# Patient Record
Sex: FEMALE | Race: WHITE | Employment: FULL TIME | URBAN - METROPOLITAN AREA
[De-identification: names, ages, dates, MRNs, and addresses within clinical notes are randomized per-mention and may not be internally consistent; named-entity substitution may affect disease eponyms.]

---

## 2017-11-27 ENCOUNTER — TELEPHONE (OUTPATIENT)
Dept: ENDOCRINOLOGY | Age: 57
End: 2017-11-27

## 2018-04-20 ENCOUNTER — OFFICE VISIT (OUTPATIENT)
Dept: ENDOCRINOLOGY | Age: 58
End: 2018-04-20

## 2018-04-20 VITALS
HEIGHT: 68 IN | BODY MASS INDEX: 21.7 KG/M2 | WEIGHT: 143.2 LBS | SYSTOLIC BLOOD PRESSURE: 128 MMHG | HEART RATE: 67 BPM | OXYGEN SATURATION: 98 % | DIASTOLIC BLOOD PRESSURE: 84 MMHG

## 2018-04-20 DIAGNOSIS — C73 THYROID CANCER (HCC): Primary | ICD-10-CM

## 2018-04-20 DIAGNOSIS — M85.80 OSTEOPENIA, UNSPECIFIED LOCATION: ICD-10-CM

## 2018-04-20 DIAGNOSIS — Z78.0 MENOPAUSE: ICD-10-CM

## 2018-04-20 DIAGNOSIS — E89.0 POSTOPERATIVE HYPOTHYROIDISM: ICD-10-CM

## 2018-04-20 DIAGNOSIS — E55.9 VITAMIN D DEFICIENCY: ICD-10-CM

## 2018-04-20 DIAGNOSIS — C73 THYROID CANCER (HCC): ICD-10-CM

## 2018-04-20 PROCEDURE — 99214 OFFICE O/P EST MOD 30 MIN: CPT | Performed by: INTERNAL MEDICINE

## 2018-04-20 RX ORDER — CHOLECALCIFEROL (VITAMIN D3) 1250 MCG
50000 CAPSULE ORAL WEEKLY
COMMUNITY
End: 2018-05-30 | Stop reason: SDUPTHER

## 2018-04-20 RX ORDER — LEVOTHYROXINE SODIUM 150 MCG
150 TABLET ORAL DAILY
COMMUNITY
End: 2018-04-20 | Stop reason: SDUPTHER

## 2018-04-20 RX ORDER — LEVOTHYROXINE SODIUM 150 MCG
150 TABLET ORAL DAILY
Qty: 30 TABLET | Refills: 1 | Status: SHIPPED | OUTPATIENT
Start: 2018-04-20 | End: 2018-04-30 | Stop reason: SDUPTHER

## 2018-04-20 ASSESSMENT — PATIENT HEALTH QUESTIONNAIRE - PHQ9
1. LITTLE INTEREST OR PLEASURE IN DOING THINGS: 0
SUM OF ALL RESPONSES TO PHQ QUESTIONS 1-9: 0
SUM OF ALL RESPONSES TO PHQ9 QUESTIONS 1 & 2: 0
2. FEELING DOWN, DEPRESSED OR HOPELESS: 0

## 2018-04-21 LAB
A/G RATIO: 2 (ref 1.1–2.2)
ALBUMIN SERPL-MCNC: 4.8 G/DL (ref 3.4–5)
ALP BLD-CCNC: 108 U/L (ref 40–129)
ALT SERPL-CCNC: 16 U/L (ref 10–40)
ANION GAP SERPL CALCULATED.3IONS-SCNC: 13 MMOL/L (ref 3–16)
ANTI-THYROGLOB ABS: 11 IU/ML
AST SERPL-CCNC: 24 U/L (ref 15–37)
BILIRUB SERPL-MCNC: 0.5 MG/DL (ref 0–1)
BUN BLDV-MCNC: 10 MG/DL (ref 7–20)
CALCIUM SERPL-MCNC: 9.8 MG/DL (ref 8.3–10.6)
CHLORIDE BLD-SCNC: 102 MMOL/L (ref 99–110)
CO2: 29 MMOL/L (ref 21–32)
CREAT SERPL-MCNC: 0.6 MG/DL (ref 0.6–1.1)
GFR AFRICAN AMERICAN: >60
GFR NON-AFRICAN AMERICAN: >60
GLOBULIN: 2.4 G/DL
GLUCOSE BLD-MCNC: 83 MG/DL (ref 70–99)
POTASSIUM SERPL-SCNC: 4.5 MMOL/L (ref 3.5–5.1)
SODIUM BLD-SCNC: 144 MMOL/L (ref 136–145)
T4 FREE: 1.8 NG/DL (ref 0.9–1.8)
TOTAL PROTEIN: 7.2 G/DL (ref 6.4–8.2)
TSH SERPL DL<=0.05 MIU/L-ACNC: 2.72 UIU/ML (ref 0.27–4.2)
VITAMIN D 25-HYDROXY: 38.5 NG/ML

## 2018-04-28 LAB — THYROGLOBULIN BY LC-MS/MS, SERUM/PLASMA: <0.5 NG/ML (ref 1.3–31.8)

## 2018-04-30 ENCOUNTER — TELEPHONE (OUTPATIENT)
Dept: ENDOCRINOLOGY | Age: 58
End: 2018-04-30

## 2018-04-30 DIAGNOSIS — E89.0 POSTOPERATIVE HYPOTHYROIDISM: ICD-10-CM

## 2018-04-30 RX ORDER — LEVOTHYROXINE SODIUM 150 MCG
150 TABLET ORAL DAILY
Qty: 30 TABLET | Refills: 5 | Status: SHIPPED | OUTPATIENT
Start: 2018-04-30 | End: 2018-10-29 | Stop reason: SDUPTHER

## 2018-05-31 RX ORDER — CHOLECALCIFEROL (VITAMIN D3) 1250 MCG
50000 CAPSULE ORAL WEEKLY
Qty: 4 CAPSULE | Refills: 3 | Status: SHIPPED | OUTPATIENT
Start: 2018-05-31 | End: 2018-06-04 | Stop reason: SDUPTHER

## 2018-06-01 ENCOUNTER — TELEPHONE (OUTPATIENT)
Dept: ENDOCRINOLOGY | Age: 58
End: 2018-06-01

## 2018-06-04 RX ORDER — CHOLECALCIFEROL (VITAMIN D3) 1250 MCG
50000 CAPSULE ORAL WEEKLY
Qty: 4 CAPSULE | Refills: 3 | Status: SHIPPED | OUTPATIENT
Start: 2018-06-04 | End: 2018-09-27 | Stop reason: SDUPTHER

## 2018-06-05 ENCOUNTER — TELEPHONE (OUTPATIENT)
Dept: ENDOCRINOLOGY | Age: 58
End: 2018-06-05

## 2018-07-20 ENCOUNTER — OFFICE VISIT (OUTPATIENT)
Dept: ENDOCRINOLOGY | Age: 58
End: 2018-07-20

## 2018-07-20 VITALS
BODY MASS INDEX: 21.73 KG/M2 | HEART RATE: 59 BPM | WEIGHT: 143.4 LBS | DIASTOLIC BLOOD PRESSURE: 88 MMHG | SYSTOLIC BLOOD PRESSURE: 126 MMHG | HEIGHT: 68 IN | OXYGEN SATURATION: 98 %

## 2018-07-20 DIAGNOSIS — C73 THYROID CANCER (HCC): ICD-10-CM

## 2018-07-20 DIAGNOSIS — E55.9 VITAMIN D DEFICIENCY: ICD-10-CM

## 2018-07-20 DIAGNOSIS — Z78.0 MENOPAUSE: ICD-10-CM

## 2018-07-20 DIAGNOSIS — M85.80 OSTEOPENIA, UNSPECIFIED LOCATION: ICD-10-CM

## 2018-07-20 DIAGNOSIS — E89.0 POSTOPERATIVE HYPOTHYROIDISM: Primary | ICD-10-CM

## 2018-07-20 PROCEDURE — 99214 OFFICE O/P EST MOD 30 MIN: CPT | Performed by: INTERNAL MEDICINE

## 2018-07-20 NOTE — PROGRESS NOTES
SUBJECTIVE:  Ehsan Austin is a 65 y.o. female who is here for hypothyroidism. 1. Postoperative hypothyroidism    This started in 2005. Patient was diagnosed with thyroid cancer. The problem has been unchanged. Patient started medication in 2005. Currently patient is on: Synthroid. Misses  0 doses a month. 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  8/31/2005 I-131 Tx 97mCi. Current complaints: fatigue    2. Thyroid cancer ([address]    History of obstructive symptoms: difficulty swallowing No, changes in voice/hoarseness No.  History of radiation to patient's neck: No  Resent iodine exposure: No  Family history includes no thyroid abnormalities. Family history of thyroid cancer: No    3. Osteopenia, unspecified location  Had rib fractures. 4. Vitamin D deficiency  No bone pain. 5. Menopause  No hot flashes. Stable Thyrogen stimulated antithyroglobulin level when compared to the prior 1/24/2012 study    Electronically Signed by: Diamante Stout, 11/28/2016 11:19 AM   Result Narrative   NM-THYROGEN INJECTION 95952    CLINICAL INDICATION: The patient is a 52-year-old female with a history of total thyroidectomy 6/30/2005 demonstrating multifocal papillary carcinoma with metastatic disease to lymph nodes, who received radioiodine ablation of a thyroid remnant on 8/31/2005 using 97 mCi of iodine-131. COMPARISON: Thyrogen stimulated thyroglobulin level 1/24/2012    TECHNIQUE: The patient received Thyrogen injections IM on day  1 and 2 followed by measurement of thyroglobulin and antithyroglobulin antibody levels 3 days after. FINDINGS: A stimulated thyroglobulin level was measured at 0.4 (compared with 0.3 on the prior 1/23/2012 measurement) with no evidence of antithyroglobulin antibodies.        Small heterogeneous area of decreased echogenicity within the left thyroid lobe resection bed that is nonspecific and may reflect residual tissue or postsurgical changes, not substantially changed from 1/2016. Electronically Signed by: Soheila Bernal MD, 10/27/2017 5:17 PM   Result Narrative   EXAMINATION: US-THYROID / NECK     DATE OF EXAM:  10/27/2017 4:40 PM    DEMOGRAPHICS: 65years old Female     INDICATION:     C74: Malignant neoplasm of thyroid gland (HCC)     Reason for Exam:  Malignant neoplasm of thyroid gland ([facility] [address] COMPARISON: Thyroid ultrasound 1/4/2016    TECHNIQUE: Sonographic evaluation of the thyroid was performed. FINDINGS:         Right Lobe:  The right thyroid lobe is surgically absent. Surgical bed appears normal.           Left Lobe:    The left thyroid lobe is surgically absent with a small heterogeneous hypoechoic area in the surgical bed that measures roughly 7 x 3 x 4 mm, not substantially changed from 4 x 6 x 4 mm on the prior study from 1/4/2016.           Isthmus:     Not visualized            Additional findings:   2 normal-appearing cervical lymph nodes are demonstrated. Past Medical History:   Diagnosis Date    Cancer of thyroid [facility]) 2007    Fracture 04/03/2018     Patient Active Problem List    Diagnosis Date Noted    Thyroid cancer ([facility] [address] 04/20/2018    Postoperative hypothyroidism 04/20/2018    Osteopenia 04/20/2018    Vitamin D deficiency 04/20/2018    Menopause 04/20/2018     Past Surgical History:   Procedure Laterality Date    CERVICAL FUSION  2007    TOTAL THYROIDECTOMY  2017     History reviewed. No pertinent family history. Social History     Social History    Marital status:       Spouse name: N/A    Number of children: N/A    Years of education: N/A     Social History Main Topics    Smoking status: Never Smoker    Smokeless tobacco: Never Used    Alcohol use Yes      Comment: ocassionally     Drug use: No    Sexual activity: No     Other Topics Concern    None     Social History Narrative    None     Current Outpatient Prescriptions   Medication Sig Dispense Refill    Cholecalciferol (VITAMIN D3) 02750 units CAPS Take 50,000 Units by mouth once a week 4 capsule 3    SYNTHROID 150 MCG tablet Take 1 tablet by mouth daily 30 tablet 5     No current facility-administered medications for this visit.       No Known Allergies  Family Status   Relation Status    Mother Alive    Father Alive    Sister Alive    Brother Alive    Son Alive    Son Alive       Review of Systems:  Constitutional: has fatigue, no fever, no recent weight gain, no recent weight loss, no changes in appetite  Eyes: no eye pain, no change in vision, no eye redness, no eye irritation, no double vision  Ears, nose, throat: no nasal congestion, no sore throat, no earache, no decrease in hearing, no hoarseness, no dry mouth, no sinus problems, no difficulty swallowing, no neck lumps, no dental problems, no mouth sores, no ringing in ears  Pulmonary: no shortness of breath, no wheezing, no dyspnea on exertion, no cough  Cardiovascular: no chest pain, no lower extremity edema, no orthopnea, no intermittent leg claudication, no palpitations  Gastrointestinal: no abdominal pain, no nausea, no vomiting, no diarrhea, no constipation, no heartburn, no bloating  Genitourinary: no dysuria, no urinary incontinence, no urinary hesitancy, no change in urinary frequency, no feelings of urinary urgency, no nocturia  Musculoskeletal: no joint swelling, no joint stiffness, no joint pain, no muscle cramps, no muscle pain, no bone pain  Integument/Breast: no hair loss, no skin rashes, no skin lesions, no itching, no dry skin, no breast pain, no breast mass, no skin hives, no skin discoloration, no nipple discharge  Neurological: no numbness, no tingling, no weakness, no confusion, no headaches, no dizziness, no fainting, no tremors, no decrease in memory, no balance problems  Psychiatric: no anxiety, no depression, no insomnia  Hematologic/Lymphatic: no tendency for easy bleeding, no swollen lymph nodes, no tendency for easy Thyroglobulin; Future    2. Postoperative hypothyroidism    - SYNTHROID 150 MCG tablet; Take 1 tablet by mouth daily Takes 1 tablet daily  Dispense: 30 tablet; Refill: 1  - T4, Free; Future  - TSH without Reflex; Future  - Comprehensive Metabolic Panel; Future    3. Osteopenia, unspecified location  Recent rib fracture. Obtain DXA. 4. Vitamin D deficiency    - Vitamin D 25 Hydroxy; Future    5. Menopause  Follow. 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  8/31/2005 I-131 Tx 97mCi. Obtained history from other than patient No    Malinda Gutierrez was counseled regarding symptoms of thyroid diagnosis, course and complications of disease if inadequately treated, side effects of medications, diagnosis, treatment options, and prognosis, risks, benefits, complications, and alternatives of treatment, labs, imaging and other studies and treatment targets and goals. She understands instructions and counseling. Return in about 6 months (around 1/20/2019) for thyroid problems.

## 2018-09-27 RX ORDER — METHOCARBAMOL 750 MG/1
TABLET ORAL
Qty: 4 CAPSULE | Refills: 3 | Status: SHIPPED | OUTPATIENT
Start: 2018-09-27 | End: 2019-04-14 | Stop reason: SDUPTHER

## 2018-10-29 DIAGNOSIS — E89.0 POSTOPERATIVE HYPOTHYROIDISM: ICD-10-CM

## 2018-10-30 ENCOUNTER — TELEPHONE (OUTPATIENT)
Dept: ENDOCRINOLOGY | Age: 58
End: 2018-10-30

## 2018-10-30 RX ORDER — LEVOTHYROXINE SODIUM 150 MCG
TABLET ORAL
Qty: 30 TABLET | Refills: 5 | Status: SHIPPED | OUTPATIENT
Start: 2018-10-30 | End: 2018-12-21 | Stop reason: SDUPTHER

## 2018-12-21 ENCOUNTER — OFFICE VISIT (OUTPATIENT)
Dept: ENDOCRINOLOGY | Age: 58
End: 2018-12-21
Payer: COMMERCIAL

## 2018-12-21 VITALS
SYSTOLIC BLOOD PRESSURE: 131 MMHG | WEIGHT: 145.6 LBS | HEIGHT: 68 IN | BODY MASS INDEX: 22.07 KG/M2 | HEART RATE: 67 BPM | DIASTOLIC BLOOD PRESSURE: 81 MMHG

## 2018-12-21 DIAGNOSIS — E89.0 POSTOPERATIVE HYPOTHYROIDISM: ICD-10-CM

## 2018-12-21 DIAGNOSIS — Z78.0 MENOPAUSE: ICD-10-CM

## 2018-12-21 DIAGNOSIS — M85.80 OSTEOPENIA, UNSPECIFIED LOCATION: ICD-10-CM

## 2018-12-21 DIAGNOSIS — C73 THYROID CANCER (HCC): Primary | ICD-10-CM

## 2018-12-21 DIAGNOSIS — E55.9 VITAMIN D DEFICIENCY: ICD-10-CM

## 2018-12-21 PROCEDURE — 99214 OFFICE O/P EST MOD 30 MIN: CPT | Performed by: INTERNAL MEDICINE

## 2018-12-21 RX ORDER — LEVOTHYROXINE SODIUM 150 MCG
TABLET ORAL
Qty: 30 TABLET | Refills: 5 | Status: SHIPPED | OUTPATIENT
Start: 2018-12-21 | End: 2019-01-09 | Stop reason: SDUPTHER

## 2018-12-21 NOTE — PROGRESS NOTES
may reflect residual tissue or postsurgical changes, not substantially changed from 1/2016. Electronically Signed by: Hannah Rios MD, 10/27/2017 5:17 PM   Result Narrative   EXAMINATION: US-THYROID / NECK     DATE OF EXAM:  10/27/2017 4:40 PM    DEMOGRAPHICS: 65years old Female     INDICATION:     C74: Malignant neoplasm of thyroid gland (HCC)     Reason for Exam:  Malignant neoplasm of thyroid gland ([facility] [address] COMPARISON: Thyroid ultrasound 1/4/2016    TECHNIQUE: Sonographic evaluation of the thyroid was performed. FINDINGS:         Right Lobe:  The right thyroid lobe is surgically absent. Surgical bed appears normal.           Left Lobe:    The left thyroid lobe is surgically absent with a small heterogeneous hypoechoic area in the surgical bed that measures roughly 7 x 3 x 4 mm, not substantially changed from 4 x 6 x 4 mm on the prior study from 1/4/2016.           Isthmus:     Not visualized            Additional findings:   2 normal-appearing cervical lymph nodes are demonstrated. Past Medical History:   Diagnosis Date    Cancer of thyroid [facility]) 2007    Fracture 04/03/2018     Patient Active Problem List    Diagnosis Date Noted    Thyroid cancer ([address] 04/20/2018    Postoperative hypothyroidism 04/20/2018    Osteopenia 04/20/2018    Vitamin D deficiency 04/20/2018    Menopause 04/20/2018     Past Surgical History:   Procedure Laterality Date    CERVICAL FUSION  2007    TOTAL THYROIDECTOMY  2017     History reviewed. No pertinent family history. Social History     Social History    Marital status:       Spouse name: N/A    Number of children: N/A    Years of education: N/A     Social History Main Topics    Smoking status: Never Smoker    Smokeless tobacco: Never Used    Alcohol use Yes      Comment: ocassionally     Drug use: No    Sexual activity: No     Other Topics Concern    None     Social History Narrative    None     Current Outpatient Prescriptions

## 2019-01-09 ENCOUNTER — PATIENT MESSAGE (OUTPATIENT)
Dept: ENDOCRINOLOGY | Age: 59
End: 2019-01-09

## 2019-01-09 DIAGNOSIS — E89.0 POSTOPERATIVE HYPOTHYROIDISM: ICD-10-CM

## 2019-01-09 RX ORDER — LEVOTHYROXINE SODIUM 150 MCG
TABLET ORAL
Qty: 30 TABLET | Refills: 5 | Status: SHIPPED | OUTPATIENT
Start: 2019-01-09 | End: 2019-06-21

## 2019-01-11 ENCOUNTER — TELEPHONE (OUTPATIENT)
Dept: ENDOCRINOLOGY | Age: 59
End: 2019-01-11

## 2019-04-15 RX ORDER — METHOCARBAMOL 750 MG/1
TABLET ORAL
Qty: 4 CAPSULE | Refills: 0 | Status: SHIPPED | OUTPATIENT
Start: 2019-04-15 | End: 2019-06-21

## 2019-05-14 ENCOUNTER — TELEPHONE (OUTPATIENT)
Dept: ENDOCRINOLOGY | Age: 59
End: 2019-05-14

## 2019-06-21 ENCOUNTER — OFFICE VISIT (OUTPATIENT)
Dept: ENDOCRINOLOGY | Age: 59
End: 2019-06-21
Payer: COMMERCIAL

## 2019-06-21 VITALS
WEIGHT: 140.2 LBS | SYSTOLIC BLOOD PRESSURE: 169 MMHG | OXYGEN SATURATION: 99 % | BODY MASS INDEX: 21.25 KG/M2 | HEIGHT: 68 IN | HEART RATE: 83 BPM | DIASTOLIC BLOOD PRESSURE: 109 MMHG

## 2019-06-21 DIAGNOSIS — E89.0 POSTOPERATIVE HYPOTHYROIDISM: Primary | ICD-10-CM

## 2019-06-21 DIAGNOSIS — C73 THYROID CANCER (HCC): ICD-10-CM

## 2019-06-21 DIAGNOSIS — E55.9 VITAMIN D DEFICIENCY: ICD-10-CM

## 2019-06-21 DIAGNOSIS — M85.80 OSTEOPENIA, UNSPECIFIED LOCATION: ICD-10-CM

## 2019-06-21 DIAGNOSIS — Z78.0 MENOPAUSE: ICD-10-CM

## 2019-06-21 PROCEDURE — 99214 OFFICE O/P EST MOD 30 MIN: CPT | Performed by: INTERNAL MEDICINE

## 2019-06-21 RX ORDER — LEVOTHYROXINE SODIUM 150 MCG
TABLET ORAL
Qty: 30 TABLET | Refills: 5 | Status: SHIPPED | OUTPATIENT
Start: 2019-06-21 | End: 2019-08-16 | Stop reason: DRUGHIGH

## 2019-06-21 NOTE — PROGRESS NOTES
0.3 x 0.4 x 0.7 cm. Stable Thyrogen stimulated antithyroglobulin level when compared to the prior 1/24/2012 study    Electronically Signed by: Bethaniegabino Isabel, 11/28/2016 11:19 AM   Result Narrative   NM-THYROGEN INJECTION [id number]    CLINICAL INDICATION: The patient is a 81-year-old female with a history of total thyroidectomy 6/30/2005 demonstrating multifocal papillary carcinoma with metastatic disease to lymph nodes, who received radioiodine ablation of a thyroid remnant on 8/31/2005 using 97 mCi of iodine-131. COMPARISON: Thyrogen stimulated thyroglobulin level 1/24/2012    TECHNIQUE: The patient received Thyrogen injections IM on day  1 and 2 followed by measurement of thyroglobulin and antithyroglobulin antibody levels 3 days after. FINDINGS: A stimulated thyroglobulin level was measured at 0.4 (compared with 0.3 on the prior 1/23/2012 measurement) with no evidence of antithyroglobulin antibodies. Small heterogeneous area of decreased echogenicity within the left thyroid lobe resection bed that is nonspecific and may reflect residual tissue or postsurgical changes, not substantially changed from 1/2016. Electronically Signed by: Tana Cool MD, 10/27/2017 5:17 PM   Result Narrative   EXAMINATION: US-THYROID / NECK     DATE OF EXAM:  10/27/2017 4:40 PM    DEMOGRAPHICS: 65years old Female     INDICATION:     C74: Malignant neoplasm of thyroid gland (HCC)     Reason for Exam:  Malignant neoplasm of thyroid gland ([address] COMPARISON: Thyroid ultrasound 1/4/2016    TECHNIQUE: Sonographic evaluation of the thyroid was performed. FINDINGS:         Right Lobe:  The right thyroid lobe is surgically absent.  Surgical bed appears normal.           Left Lobe:    The left thyroid lobe is surgically absent with a small heterogeneous hypoechoic area in the surgical bed that measures roughly 7 x 3 x 4 mm, not substantially changed from 4 x 6 x 4 mm on the prior study from 1/4/2016.           Isthmus:     Not visualized            Additional findings:   2 normal-appearing cervical lymph nodes are demonstrated. Past Medical History:   Diagnosis Date    Cancer of thyroid [facility]) 2007    Fracture 04/03/2018     Patient Active Problem List    Diagnosis Date Noted    Thyroid cancer ([facility] [address] 04/20/2018    Postoperative hypothyroidism 04/20/2018    Osteopenia 04/20/2018    Vitamin D deficiency 04/20/2018    Menopause 04/20/2018     Past Surgical History:   Procedure Laterality Date    CERVICAL FUSION  2007    TOTAL THYROIDECTOMY  2017     History reviewed. No pertinent family history. Social History     Socioeconomic History    Marital status:       Spouse name: None    Number of children: None    Years of education: None    Highest education level: None   Occupational History    None   Social Needs    Financial resource strain: None    Food insecurity:     Worry: None     Inability: None    Transportation needs:     Medical: None     Non-medical: None   Tobacco Use    Smoking status: Never Smoker    Smokeless tobacco: Never Used   Substance and Sexual Activity    Alcohol use: Yes     Comment: ocassionally     Drug use: No    Sexual activity: Never   Lifestyle    Physical activity:     Days per week: None     Minutes per session: None    Stress: None   Relationships    Social connections:     Talks on phone: None     Gets together: None     Attends Zoroastrian service: None     Active member of club or organization: None     Attends meetings of clubs or organizations: None     Relationship status: None    Intimate partner violence:     Fear of current or ex partner: None     Emotionally abused: None     Physically abused: None     Forced sexual activity: None   Other Topics Concern    None   Social History Narrative    None     Current Outpatient Medications   Medication Sig Dispense Refill    SYNTHROID 150 MCG tablet TAKE 1 TABLET BY MOUTH EVERY DAY 30 tablet 5    vitamin D (D3-50) 19434 UNIT CAPS TAKE ONE CAPSULE BY MOUTH ONCE every 2 weeks 4 capsule 3     No current facility-administered medications for this visit.       No Known Allergies  Family Status   Relation Name Status    Mother  Alive    Father  Alive    Sister  Alive    Brother  Alive    Son  Alive    Son  Alive       Review of Systems:  Constitutional: has fatigue, no fever, no recent weight gain, no recent weight loss, no changes in appetite  Eyes: no eye pain, no change in vision, no eye redness, no eye irritation, no double vision  Ears, nose, throat: no nasal congestion, no sore throat, no earache, no decrease in hearing, no hoarseness, no dry mouth, no sinus problems, no difficulty swallowing, no neck lumps, no dental problems, no mouth sores, no ringing in ears  Pulmonary: no shortness of breath, no wheezing, no dyspnea on exertion, no cough  Cardiovascular: no chest pain, no lower extremity edema, no orthopnea, no intermittent leg claudication, no palpitations  Gastrointestinal: no abdominal pain, no nausea, no vomiting, no diarrhea, no constipation, no heartburn, no bloating  Genitourinary: no dysuria, no urinary incontinence, no urinary hesitancy, no change in urinary frequency, no feelings of urinary urgency, no nocturia  Musculoskeletal: no joint swelling, no joint stiffness, no joint pain, no muscle cramps, no muscle pain, no bone pain  Integument/Breast: no hair loss, no skin rashes, no skin lesions, no itching, no dry skin, no breast pain, no breast mass, no skin hives, no skin discoloration, no nipple discharge  Neurological: no numbness, no tingling, no weakness, no confusion, no headaches, no dizziness, no fainting, no tremors, no decrease in memory, no balance problems  Psychiatric: no anxiety, no depression, no insomnia  Hematologic/Lymphatic: no tendency for easy bleeding, no swollen lymph nodes, no tendency for easy bruising  Immunology: no seasonal allergies, no frequent infections, no frequent illnesses  Endocrine: no temperature intolerance, no hot flashes, no hand tremor    OBJECTIVE:   BP (!) 157/100 (Site: Left Upper Arm, Position: Sitting, Cuff Size: Small Adult)   Pulse 83   Ht 5' 8\" (1.727 m)   Wt 140 lb 3.2 oz (63.6 kg)   SpO2 99%   BMI 21.32 kg/m²   Wt Readings from Last 3 Encounters:   06/21/19 140 lb 3.2 oz (63.6 kg)   12/21/18 145 lb 9.6 oz (66 kg)   07/20/18 143 lb 6.4 oz (65 kg)       Physical Exam:  Constitutional: no acute distress, well appearing, well nourished  Psychiatric: oriented to person, place and time, judgement, insight and normal, recent and remote memory and intact and mood, affect are normal  Skin: skin and subcutaneous tissue is normal without mass, normal turgor  Head and Face: examination of head and face revealed no abnormalities  Eyes: no lid or conjunctival swelling, no erythema or discharge, pupils are normal, equal, round, and reactive to light  Ears/Nose: external inspection of ears and nose revealed no abnormalities, hearing is grossly normal  Oropharynx/Mouth/Face: lips, tongue and gums are normal with no lesions, the voice quality was normal  Neck: neck is supple and symmetric, with midline trachea and no masses, thyroid is absent  Lymphatics: normal cervical lymph nodes, normal supraclavicular nodes  Pulmonary: no increased work of breathing or signs of respiratory distress, lungs are clear to auscultation  Cardiovascular: normal heart rate and rhythm, normal S1 and S2, no murmurs and pedal pulses and 2+ bilaterally, No edema  Abdomen: abdomen is soft, non-tender with no masses  Musculoskeletal: normal gait and station, exam of the digits and nails are normal  Neurological: normal coordination, normal general cortical function    Lab Review:  Lab Results   Component Value Date    TSH 2.72 04/20/2018     No results found for: FREET4     ASSESSMENT/PLAN:    1.  Thyroid cancer ([facility] [address]  Hydrogen stimulated scans on 1/24/2012 and 11/20/2016   Thyroglobulin

## 2019-08-04 ENCOUNTER — TELEPHONE (OUTPATIENT)
Dept: ENDOCRINOLOGY | Age: 59
End: 2019-08-04

## 2019-08-04 NOTE — TELEPHONE ENCOUNTER
Bone density worse, highest T-score is -2.3.  -2.5 is osteoporosis. Significant worsening of bone density in spine by over 11 percent, in hip over 13%. I recommend an appointment to discuss treatment options.

## 2019-08-13 ENCOUNTER — TELEPHONE (OUTPATIENT)
Dept: ENDOCRINOLOGY | Age: 59
End: 2019-08-13

## 2019-08-16 ENCOUNTER — OFFICE VISIT (OUTPATIENT)
Dept: ENDOCRINOLOGY | Age: 59
End: 2019-08-16
Payer: COMMERCIAL

## 2019-08-16 VITALS
SYSTOLIC BLOOD PRESSURE: 139 MMHG | WEIGHT: 135.2 LBS | BODY MASS INDEX: 20.49 KG/M2 | HEIGHT: 68 IN | OXYGEN SATURATION: 98 % | DIASTOLIC BLOOD PRESSURE: 89 MMHG | HEART RATE: 88 BPM

## 2019-08-16 DIAGNOSIS — E55.9 VITAMIN D DEFICIENCY: ICD-10-CM

## 2019-08-16 DIAGNOSIS — C73 THYROID CANCER (HCC): Primary | ICD-10-CM

## 2019-08-16 DIAGNOSIS — M85.80 OSTEOPENIA, UNSPECIFIED LOCATION: ICD-10-CM

## 2019-08-16 DIAGNOSIS — Z78.0 MENOPAUSE: ICD-10-CM

## 2019-08-16 DIAGNOSIS — E89.0 POSTOPERATIVE HYPOTHYROIDISM: ICD-10-CM

## 2019-08-16 PROCEDURE — 99214 OFFICE O/P EST MOD 30 MIN: CPT | Performed by: INTERNAL MEDICINE

## 2019-08-16 RX ORDER — LEVOTHYROXINE SODIUM 150 MCG
TABLET ORAL
Qty: 30 TABLET | Refills: 3
Start: 2019-08-16 | End: 2019-11-19

## 2019-08-16 RX ORDER — NEBIVOLOL HYDROCHLORIDE 5 MG/1
TABLET ORAL
Refills: 1 | COMMUNITY
Start: 2019-07-22

## 2019-08-16 RX ORDER — LEVOTHYROXINE SODIUM 0.15 MG/1
150 TABLET ORAL DAILY
COMMUNITY
End: 2019-08-16 | Stop reason: ALTCHOICE

## 2019-08-16 NOTE — PROGRESS NOTES
Isthmus:     Not visualized            Additional findings:   2 normal-appearing cervical lymph nodes are demonstrated. Past Medical History:   Diagnosis Date    Cancer of thyroid [facility]) 2007    Fracture 04/03/2018     Patient Active Problem List    Diagnosis Date Noted    Thyroid cancer ([facility] [address] 04/20/2018    Postoperative hypothyroidism 04/20/2018    Osteopenia 04/20/2018    Vitamin D deficiency 04/20/2018    Menopause 04/20/2018     Past Surgical History:   Procedure Laterality Date    CERVICAL FUSION  2007    TOTAL THYROIDECTOMY  2017     History reviewed. No pertinent family history. Social History     Socioeconomic History    Marital status:       Spouse name: None    Number of children: None    Years of education: None    Highest education level: None   Occupational History    None   Social Needs    Financial resource strain: None    Food insecurity:     Worry: None     Inability: None    Transportation needs:     Medical: None     Non-medical: None   Tobacco Use    Smoking status: Never Smoker    Smokeless tobacco: Never Used   Substance and Sexual Activity    Alcohol use: Yes     Comment: ocassionally     Drug use: No    Sexual activity: Never   Lifestyle    Physical activity:     Days per week: None     Minutes per session: None    Stress: None   Relationships    Social connections:     Talks on phone: None     Gets together: None     Attends Scientology service: None     Active member of club or organization: None     Attends meetings of clubs or organizations: None     Relationship status: None    Intimate partner violence:     Fear of current or ex partner: None     Emotionally abused: None     Physically abused: None     Forced sexual activity: None   Other Topics Concern    None   Social History Narrative    None     Current Outpatient Medications   Medication Sig Dispense Refill    BYSTOLIC 5 MG tablet TAKE 1 TABLET BY MOUTH EVERY DAY AT NIGHT  1   

## 2019-09-16 ENCOUNTER — TELEPHONE (OUTPATIENT)
Dept: ENDOCRINOLOGY | Age: 59
End: 2019-09-16

## 2019-09-16 DIAGNOSIS — M85.80 OSTEOPENIA, UNSPECIFIED LOCATION: ICD-10-CM

## 2019-09-16 DIAGNOSIS — E89.0 POSTOPERATIVE HYPOTHYROIDISM: ICD-10-CM

## 2019-09-16 DIAGNOSIS — Z78.0 MENOPAUSE: ICD-10-CM

## 2019-09-16 DIAGNOSIS — C73 THYROID CANCER (HCC): Primary | ICD-10-CM

## 2019-09-16 DIAGNOSIS — E55.9 VITAMIN D DEFICIENCY: ICD-10-CM

## 2019-09-28 ENCOUNTER — TELEPHONE (OUTPATIENT)
Dept: ENDOCRINOLOGY | Age: 59
End: 2019-09-28

## 2019-11-19 ENCOUNTER — OFFICE VISIT (OUTPATIENT)
Dept: ENDOCRINOLOGY | Age: 59
End: 2019-11-19
Payer: COMMERCIAL

## 2019-11-19 VITALS
SYSTOLIC BLOOD PRESSURE: 118 MMHG | BODY MASS INDEX: 21.13 KG/M2 | RESPIRATION RATE: 16 BRPM | WEIGHT: 139 LBS | HEART RATE: 64 BPM | DIASTOLIC BLOOD PRESSURE: 84 MMHG | OXYGEN SATURATION: 98 % | TEMPERATURE: 98.4 F

## 2019-11-19 DIAGNOSIS — E55.9 VITAMIN D DEFICIENCY: ICD-10-CM

## 2019-11-19 DIAGNOSIS — E89.0 POSTOPERATIVE HYPOTHYROIDISM: ICD-10-CM

## 2019-11-19 DIAGNOSIS — C73 THYROID CANCER (HCC): Primary | ICD-10-CM

## 2019-11-19 DIAGNOSIS — M85.80 OSTEOPENIA, UNSPECIFIED LOCATION: ICD-10-CM

## 2019-11-19 DIAGNOSIS — Z78.0 MENOPAUSE: ICD-10-CM

## 2019-11-19 PROCEDURE — 99214 OFFICE O/P EST MOD 30 MIN: CPT | Performed by: INTERNAL MEDICINE

## 2019-11-19 RX ORDER — LEVOTHYROXINE SODIUM 150 MCG
TABLET ORAL
Qty: 90 TABLET | Refills: 1 | Status: SHIPPED | OUTPATIENT
Start: 2019-11-19 | End: 2020-07-20

## 2020-03-06 ENCOUNTER — TELEPHONE (OUTPATIENT)
Dept: ENDOCRINOLOGY | Age: 60
End: 2020-03-06

## 2020-03-06 NOTE — TELEPHONE ENCOUNTER
Pt called stating she is having heart palpitations. Pt denies any anxiety and stated she has been evaluated by her PCP. Pt requested a phone consultation by Dr. Donald Bethea. Pt was advised that [address] [place] [address] physicians do not do phone consultations. Pt stated she is having her blood work completed on Monday 3/9/20 and will be seeing her PCP again to make sure she doesn't have any health problems.

## 2020-03-09 ENCOUNTER — TELEPHONE (OUTPATIENT)
Dept: ENDOCRINOLOGY | Age: 60
End: 2020-03-09

## 2020-03-10 NOTE — TELEPHONE ENCOUNTER
Thyroid test was very good, TSH 1.4. Very unlikely that is causing palpitations. It is not even borderline. It is very optimal.  I recommend to have work-up with her family doctor for palpitations.

## 2020-03-10 NOTE — TELEPHONE ENCOUNTER
Spoke with pt and advised that Thyroid test is in range and to contact PCP about palpitations. Pt stated her understanding.

## 2020-03-19 ENCOUNTER — TELEPHONE (OUTPATIENT)
Dept: ENDOCRINOLOGY | Age: 60
End: 2020-03-19

## 2020-03-20 ENCOUNTER — OFFICE VISIT (OUTPATIENT)
Dept: ENDOCRINOLOGY | Age: 60
End: 2020-03-20
Payer: COMMERCIAL

## 2020-03-20 VITALS
WEIGHT: 143.2 LBS | HEIGHT: 68 IN | SYSTOLIC BLOOD PRESSURE: 139 MMHG | BODY MASS INDEX: 21.7 KG/M2 | DIASTOLIC BLOOD PRESSURE: 82 MMHG | HEART RATE: 66 BPM | OXYGEN SATURATION: 98 %

## 2020-03-20 PROCEDURE — 99214 OFFICE O/P EST MOD 30 MIN: CPT | Performed by: INTERNAL MEDICINE

## 2020-03-20 NOTE — PROGRESS NOTES
SUBJECTIVE:  Saumya Amos is a 63 y.o. female who is here for hypothyroidism. 1. 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  8/31/2005 I-131 Tx 97mCi. History of obstructive symptoms: difficulty swallowing No, changes in voice/hoarseness No.  History of radiation to patient's neck: No  Resent iodine exposure: No  Family history includes no thyroid abnormalities. Family history of thyroid cancer: No    2. Postoperative hypothyroidism    This started in 2005. Patient was diagnosed with thyroid cancer. The problem has been unchanged. Patient started medication in 2005. Currently patient is on: Synthroid. Misses  0 doses a month. Current complaints: fatigue, sweating. Had palpitations, resolved. .  Gets more sleep. 3. Osteopenia, unspecified location  Had rib fractures. Calcium in food, takes vitamin D.    4. Vitamin D deficiency  No bone pain. 5. Menopause  No hot flashes. Stable approximately 0.2 x 0.4 x 0.7 cm slightly hypoechoic questionable lesion in the left side of the thyroid bed. Status post total thyroidectomy. This dictation was created with voice recognition software. While attempts have been made to review the dictation as it is transcribed, on occasion the spoken word can be misinterpreted by the technology leading to omissions or inappropriate words, phrases or sentences. Electronically Signed by: Debi Huang MD, 12/27/2018 5:16 PM   Result Narrative   US-HEAD/NECK SOFT TISSUE [id number], 12/27/2018 2:17 PM    63 years, Female    HISTORY: C73: Malignant neoplasm of thyroid gland ([address]    .Reason for Exam:  Malignant neoplasm of thyroid gland ([address] COMPARISON: 10/27/2017 ultrasound thyroid gland. FINDINGS:  Status post total thyroidectomy. A small focal hypoechoic questionable lesion in the left side thyroid bed measuring approximately 0.2 x 0.4 x 0.7 cm.  This appears unchanged dating back to the previous CAPSULE BY MOUTH weekly 12 capsule 1    SYNTHROID 150 MCG tablet 1 tablet 6 days a week 90 tablet 1    BYSTOLIC 5 MG tablet TAKE 1 TABLET BY MOUTH EVERY DAY AT NIGHT  1     No current facility-administered medications for this visit.       No Known Allergies  Family Status   Relation Name Status    Mother  Alive    Father  Alive    Sister  Alive    Brother  Alive    Son  Alive    Son  Alive       Review of Systems:  Constitutional: has fatigue, no fever, no recent weight gain, no recent weight loss, no changes in appetite  Eyes: no eye pain, no change in vision, no eye redness, no eye irritation, no double vision  Ears, nose, throat: no nasal congestion, no sore throat, no earache, no decrease in hearing, no hoarseness, no dry mouth, no sinus problems, no difficulty swallowing, no neck lumps, no dental problems, no mouth sores, no ringing in ears  Pulmonary: no shortness of breath, no wheezing, no dyspnea on exertion, no cough  Cardiovascular: no chest pain, no lower extremity edema, no orthopnea, no intermittent leg claudication, no palpitations  Gastrointestinal: no abdominal pain, no nausea, no vomiting, no diarrhea, no constipation, no heartburn, no bloating  Genitourinary: no dysuria, no urinary incontinence, no urinary hesitancy, no change in urinary frequency, no feelings of urinary urgency, no nocturia  Musculoskeletal: no joint swelling, no joint stiffness, no joint pain, no muscle cramps, no muscle pain, no bone pain  Integument/Breast: no hair loss, no skin rashes, no skin lesions, no itching, no dry skin, no breast pain, no breast mass, no skin hives, no skin discoloration, no nipple discharge  Neurological: no numbness, no tingling, no weakness, no confusion, no headaches, no dizziness, no fainting, no tremors, no decrease in memory, no balance problems  Psychiatric: no anxiety, no depression, no insomnia  Hematologic/Lymphatic: no tendency for easy bleeding, no swollen lymph nodes, no tendency for easy bruising  Immunology: no seasonal allergies, no frequent infections, no frequent illnesses  Endocrine: no temperature intolerance, no hot flashes, no hand tremor    OBJECTIVE:   /82 (Site: Left Upper Arm, Position: Sitting, Cuff Size: Medium Adult)   Pulse 66   Ht 5' 8\" (1.727 m)   Wt 143 lb 3.2 oz (65 kg)   SpO2 98%   BMI 21.77 kg/m²   Wt Readings from Last 3 Encounters:   03/20/20 143 lb 3.2 oz (65 kg)   11/19/19 139 lb (63 kg)   08/16/19 135 lb 3.2 oz (61.3 kg)       Physical Exam:  Constitutional: no acute distress, well appearing, well nourished  Psychiatric: oriented to person, place and time, judgement, insight and normal, recent and remote memory and intact and mood, affect are normal  Skin: skin and subcutaneous tissue is normal without mass, normal turgor  Head and Face: examination of head and face revealed no abnormalities  Eyes: no lid or conjunctival swelling, no erythema or discharge, pupils are normal, equal, round, and reactive to light  Ears/Nose: external inspection of ears and nose revealed no abnormalities, hearing is grossly normal  Oropharynx/Mouth/Face: lips, tongue and gums are normal with no lesions, the voice quality was normal  Neck: neck is supple and symmetric, with midline trachea and no masses, thyroid is absent  Lymphatics: normal cervical lymph nodes, normal supraclavicular nodes  Pulmonary: no increased work of breathing or signs of respiratory distress, lungs are clear to auscultation  Cardiovascular: normal heart rate and rhythm, normal S1 and S2, no murmurs and pedal pulses and 2+ bilaterally, No edema  Abdomen: abdomen is soft, non-tender with no masses  Musculoskeletal: normal gait and station, exam of the digits and nails are normal  Neurological: normal coordination, normal general cortical function    Lab Review:  Lab Results   Component Value Date    TSH 1.480 03/07/2020     No results found for: FREET4     ASSESSMENT/PLAN:    1.  Thyroid cancer ([facility]  Ordered neck US. Thyrogen stimulated scans on 1/24/2012 and 11/20/2016   Thyroglobulin <0.1. Thyroid lobe with antibody <1.  - Anti-Thyroglobulin Antibody; Future  - Thyroglobulin; Future    2. Postoperative hypothyroidism  Synthroid 0.15 mg 6 days a week  TSH 0.19-0.3-1.1-1.4  - T4, Free; Future  - TSH without Reflex; Future    3. Osteopenia, unspecified location  Worsening. Rib fracture. No smoking  No RA  No hip fracture in family  No long term steroid use  On alendronate before, had jaw pain and bone/joint pain. Does exercise program, does not want treatment, understand risks    4. Vitamin D deficiency  Vitamin D to 50,000 IU weekly  25 hydroxy vitamin D 60-41-36  Vitamin D 50,000 IU weekly  - Vitamin D 25 Hydroxy; Future    5. Menopause  Follow. 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  8/31/2005 I-131 Tx 97mCi. Obtained history from other than patient No    Jesús Falk was counseled regarding symptoms of thyroid, osteopenia diagnosis, course and complications of disease if inadequately treated, side effects of medications, diagnosis, treatment options, and prognosis, risks, benefits, complications, and alternatives of treatment, labs, imaging and other studies and treatment targets and goals. She understands instructions and counseling. Return in about 4 months (around 7/20/2020) for thyroid problems.

## 2020-06-23 ENCOUNTER — TELEPHONE (OUTPATIENT)
Dept: ENDOCRINOLOGY | Age: 60
End: 2020-06-23

## 2020-07-20 ENCOUNTER — OFFICE VISIT (OUTPATIENT)
Dept: ENDOCRINOLOGY | Age: 60
End: 2020-07-20
Payer: COMMERCIAL

## 2020-07-20 VITALS
WEIGHT: 143.2 LBS | SYSTOLIC BLOOD PRESSURE: 137 MMHG | HEIGHT: 68 IN | BODY MASS INDEX: 21.7 KG/M2 | TEMPERATURE: 97.5 F | DIASTOLIC BLOOD PRESSURE: 87 MMHG | HEART RATE: 66 BPM | OXYGEN SATURATION: 95 %

## 2020-07-20 PROCEDURE — 99214 OFFICE O/P EST MOD 30 MIN: CPT | Performed by: INTERNAL MEDICINE

## 2020-07-20 RX ORDER — LEVOTHYROXINE SODIUM 150 MCG
TABLET ORAL
Qty: 90 TABLET | Refills: 1
Start: 2020-07-20 | End: 2020-10-22

## 2020-07-20 NOTE — PROGRESS NOTES
SUBJECTIVE:  Katherine Holt is a 63 y.o. female who is here for hypothyroidism. 1. 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  8/31/2005 I-131 Tx 97mCi. History of obstructive symptoms: difficulty swallowing No, changes in voice/hoarseness No.  History of radiation to patient's neck: No  Resent iodine exposure: No  Family history includes no thyroid abnormalities. Family history of thyroid cancer: No    2. Postoperative hypothyroidism    This started in 2005. Patient was diagnosed with thyroid cancer. The problem has been unchanged. Patient started medication in 2005. Currently patient is on: Synthroid. Misses  0 doses a month. Current complaints: fatigue, sweating. Had palpitations, resolved. Gets more sleep. 3. Osteopenia, unspecified location  Had rib fractures. Calcium in food, takes vitamin D.    4. Vitamin D deficiency  No bone pain. 5. Menopause  No hot flashes. Stable approximately 0.2 x 0.4 x 0.7 cm slightly hypoechoic questionable lesion in the left side of the thyroid bed. Status post total thyroidectomy. This dictation was created with voice recognition software. While attempts have been made to review the dictation as it is transcribed, on occasion the spoken word can be misinterpreted by the technology leading to omissions or inappropriate words, phrases or sentences. Electronically Signed by: Nena Sandoval MD, 12/27/2018 5:16 PM   Result Narrative   US-HEAD/NECK SOFT TISSUE [id number], 12/27/2018 2:17 PM    63 years, Female    HISTORY: C73: Malignant neoplasm of thyroid gland ([address]    .Reason for Exam:  Malignant neoplasm of thyroid gland ([address] COMPARISON: 10/27/2017 ultrasound thyroid gland. FINDINGS:  Status post total thyroidectomy. A small focal hypoechoic questionable lesion in the left side thyroid bed measuring approximately 0.2 x 0.4 x 0.7 cm.  This appears unchanged dating back to the previous examination at which time it measured 0.3 x 0.4 x 0.7 cm. Stable Thyrogen stimulated antithyroglobulin level when compared to the prior 1/24/2012 study    Electronically Signed by: Asif Miller, 11/28/2016 11:19 AM   Result Narrative   NM-THYROGEN INJECTION [id number]    CLINICAL INDICATION: The patient is a 52-year-old female with a history of total thyroidectomy 6/30/2005 demonstrating multifocal papillary carcinoma with metastatic disease to lymph nodes, who received radioiodine ablation of a thyroid remnant on 8/31/2005 using 97 mCi of iodine-131. COMPARISON: Thyrogen stimulated thyroglobulin level 1/24/2012    TECHNIQUE: The patient received Thyrogen injections IM on day  1 and 2 followed by measurement of thyroglobulin and antithyroglobulin antibody levels 3 days after. FINDINGS: A stimulated thyroglobulin level was measured at 0.4 (compared with 0.3 on the prior 1/23/2012 measurement) with no evidence of antithyroglobulin antibodies. Small heterogeneous area of decreased echogenicity within the left thyroid lobe resection bed that is nonspecific and may reflect residual tissue or postsurgical changes, not substantially changed from 1/2016. Electronically Signed by: Nadege Paige MD, 10/27/2017 5:17 PM   Result Narrative   EXAMINATION: US-THYROID / NECK     DATE OF EXAM:  10/27/2017 4:40 PM    DEMOGRAPHICS: 65years old Female     INDICATION:     C74: Malignant neoplasm of thyroid gland ([facility])     Reason for Exam:  Malignant neoplasm of thyroid gland ([address] COMPARISON: Thyroid ultrasound 1/4/2016    TECHNIQUE: Sonographic evaluation of the thyroid was performed. FINDINGS:         Right Lobe:  The right thyroid lobe is surgically absent.  Surgical bed appears normal.           Left Lobe:    The left thyroid lobe is surgically absent with a small heterogeneous hypoechoic area in the surgical bed that measures roughly 7 x 3 x 4 mm, not substantially changed from 4 x 6 x 4 mm on the prior study from 1/4/2016.           Isthmus:     Not visualized            Additional findings:   2 normal-appearing cervical lymph nodes are demonstrated. Past Medical History:   Diagnosis Date    Cancer of thyroid [facility]) 2007    Fracture 04/03/2018     Patient Active Problem List    Diagnosis Date Noted    Thyroid cancer ([address] 04/20/2018    Postoperative hypothyroidism 04/20/2018    Osteopenia 04/20/2018    Vitamin D deficiency 04/20/2018    Menopause 04/20/2018     Past Surgical History:   Procedure Laterality Date    CERVICAL FUSION  2007    TOTAL THYROIDECTOMY  2017     History reviewed. No pertinent family history. Social History     Socioeconomic History    Marital status:       Spouse name: None    Number of children: None    Years of education: None    Highest education level: None   Occupational History    None   Social Needs    Financial resource strain: None    Food insecurity     Worry: None     Inability: None    Transportation needs     Medical: None     Non-medical: None   Tobacco Use    Smoking status: Never Smoker    Smokeless tobacco: Never Used   Substance and Sexual Activity    Alcohol use: Yes     Comment: ocassionally     Drug use: No    Sexual activity: Never   Lifestyle    Physical activity     Days per week: None     Minutes per session: None    Stress: None   Relationships    Social connections     Talks on phone: None     Gets together: None     Attends Yarsanism service: None     Active member of club or organization: None     Attends meetings of clubs or organizations: None     Relationship status: None    Intimate partner violence     Fear of current or ex partner: None     Emotionally abused: None     Physically abused: None     Forced sexual activity: None   Other Topics Concern    None   Social History Narrative    None     Current Outpatient Medications   Medication Sig Dispense Refill    SYNTHROID 150 MCG tablet 1 tablet 6 days a week, 1/2 tablet 1 day a week 90 tablet 1    vitamin D (D3-50) 88474 UNIT CAPS TAKE ONE CAPSULE BY MOUTH weekly 12 capsule 1    BYSTOLIC 5 MG tablet TAKE 1 TABLET BY MOUTH EVERY DAY AT NIGHT  1     No current facility-administered medications for this visit.       No Known Allergies  Family Status   Relation Name Status    Mother  Alive    Father  Alive    Sister  Alive    Brother  Alive    Son  Alive    Son  Alive       Review of Systems:  Constitutional: has fatigue, no fever, no recent weight gain, no recent weight loss, no changes in appetite  Eyes: no eye pain, no change in vision, no eye redness, no eye irritation, no double vision  Ears, nose, throat: no nasal congestion, no sore throat, no earache, no decrease in hearing, no hoarseness, no dry mouth, no sinus problems, no difficulty swallowing, no neck lumps, no dental problems, no mouth sores, no ringing in ears  Pulmonary: no shortness of breath, no wheezing, no dyspnea on exertion, no cough  Cardiovascular: no chest pain, no lower extremity edema, no orthopnea, no intermittent leg claudication, no palpitations  Gastrointestinal: no abdominal pain, no nausea, no vomiting, no diarrhea, no constipation, no heartburn, no bloating  Genitourinary: no dysuria, no urinary incontinence, no urinary hesitancy, no change in urinary frequency, no feelings of urinary urgency, no nocturia  Musculoskeletal: no joint swelling, no joint stiffness, no joint pain, no muscle cramps, no muscle pain, no bone pain  Integument/Breast: no hair loss, no skin rashes, no skin lesions, no itching, no dry skin, no breast pain, no breast mass, no skin hives, no skin discoloration, no nipple discharge  Neurological: no numbness, no tingling, no weakness, no confusion, no headaches, no dizziness, no fainting, no tremors, no decrease in memory, no balance problems  Psychiatric: no anxiety, no depression, no insomnia  Hematologic/Lymphatic: no tendency for easy bleeding, no swollen lymph FREET4     ASSESSMENT/PLAN:    1. Thyroid cancer   Call for Tg results  Ordered neck US for later   Thyrogen stimulated scans on 1/24/2012 and 11/20/2016   Thyroglobulin <0.1. Thyroid lobe with antibody <1.  - Anti-Thyroglobulin Antibody; Future  - Thyroglobulin; Future    2. Postoperative hypothyroidism  Worse  Uncontrolled  Increase Synthroid to  0.15 mg 6 days a week, 1/2 tablet 1 day a week  TSH 0.19-0.3-1.1-1.4-3.8  - T4, Free; Future  - TSH without Reflex; Future    3. Osteopenia, unspecified location  Worsening. Rib fracture. No smoking  No RA  No hip fracture in family  No long term steroid use  On alendronate before, had jaw pain and bone/joint pain. Does exercise program, does not want treatment, understand risks    4. Vitamin D deficiency  Vitamin D to 50,000 IU weekly  25 hydroxy vitamin D 60-41-36  Vitamin D 50,000 IU weekly  - Vitamin D 25 Hydroxy; Future    5. Menopause  Follow. 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  8/31/2005 I-131 Tx 97i. Obtained history from other than patient No    Haven Pool was counseled regarding symptoms of thyroid, osteopenia diagnosis, course and complications of disease if inadequately treated, side effects of medications, diagnosis, treatment options, and prognosis, risks, benefits, complications, and alternatives of treatment, labs, imaging and other studies and treatment targets and goals. She understands instructions and counseling. Return in about 3 months (around 10/20/2020) for thyroid problems.

## 2020-08-07 ENCOUNTER — TELEPHONE (OUTPATIENT)
Dept: ENDOCRINOLOGY | Age: 60
End: 2020-08-07

## 2020-08-07 NOTE — TELEPHONE ENCOUNTER
PT stated that she has completed the Thyroglobulin test 7/22/20 at [facility] and has received her results. Wanted to know if we received the results.  She would like a call back

## 2020-10-22 ENCOUNTER — OFFICE VISIT (OUTPATIENT)
Dept: ENDOCRINOLOGY | Age: 60
End: 2020-10-22
Payer: COMMERCIAL

## 2020-10-22 VITALS
HEIGHT: 68 IN | WEIGHT: 144.8 LBS | BODY MASS INDEX: 21.95 KG/M2 | DIASTOLIC BLOOD PRESSURE: 84 MMHG | SYSTOLIC BLOOD PRESSURE: 138 MMHG | RESPIRATION RATE: 14 BRPM | OXYGEN SATURATION: 98 % | TEMPERATURE: 97.4 F | HEART RATE: 75 BPM

## 2020-10-22 PROCEDURE — 99214 OFFICE O/P EST MOD 30 MIN: CPT | Performed by: INTERNAL MEDICINE

## 2020-10-22 RX ORDER — LEVOTHYROXINE SODIUM 150 MCG
TABLET ORAL
Qty: 90 TABLET | Refills: 1 | Status: SHIPPED | OUTPATIENT
Start: 2020-10-22 | End: 2021-05-11

## 2020-10-22 NOTE — PROGRESS NOTES
SUBJECTIVE:  Tess Serrato is a 62 y.o. female who is here for hypothyroidism. 1. 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  8/31/2005 I-131 Tx 97mCi. History of obstructive symptoms: difficulty swallowing No, changes in voice/hoarseness No.  History of radiation to patient's neck: No  Resent iodine exposure: No  Family history includes no thyroid abnormalities. Family history of thyroid cancer: No    2. Postoperative hypothyroidism    This started in 2005. Patient was diagnosed with thyroid cancer. The problem has been unchanged. Patient started medication in 2005. Currently patient is on: Synthroid. Misses  0 doses a month. Current complaints: fatigue, sweating. Had palpitations, resolved. Gets more sleep. 3. Osteopenia, unspecified location  Had rib fractures. Calcium in food, takes vitamin D.    4. Vitamin D deficiency  No bone pain. 5. Menopause  No hot flashes. Stable approximately 0.2 x 0.4 x 0.7 cm slightly hypoechoic questionable lesion in the left side of the thyroid bed. Status post total thyroidectomy. This dictation was created with voice recognition software. While attempts have been made to review the dictation as it is transcribed, on occasion the spoken word can be misinterpreted by the technology leading to omissions or inappropriate words, phrases or sentences. Electronically Signed by: Aurelia Levine MD, 12/27/2018 5:16 PM   Result Narrative   US-HEAD/NECK SOFT TISSUE [id number], 12/27/2018 2:17 PM    63 years, Female    HISTORY: C73: Malignant neoplasm of thyroid gland ([address]    .Reason for Exam:  Malignant neoplasm of thyroid gland ([address] COMPARISON: 10/27/2017 ultrasound thyroid gland. FINDINGS:  Status post total thyroidectomy. A small focal hypoechoic questionable lesion in the left side thyroid bed measuring approximately 0.2 x 0.4 x 0.7 cm.  This appears unchanged dating back to the previous examination at which time it measured 0.3 x 0.4 x 0.7 cm. Stable Thyrogen stimulated antithyroglobulin level when compared to the prior 1/24/2012 study    Electronically Signed by: Donald Goff, 11/28/2016 11:19 AM   Result Narrative   NM-THYROGEN INJECTION [id number]    CLINICAL INDICATION: The patient is a 78-year-old female with a history of total thyroidectomy 6/30/2005 demonstrating multifocal papillary carcinoma with metastatic disease to lymph nodes, who received radioiodine ablation of a thyroid remnant on 8/31/2005 using 97 mCi of iodine-131. COMPARISON: Thyrogen stimulated thyroglobulin level 1/24/2012    TECHNIQUE: The patient received Thyrogen injections IM on day  1 and 2 followed by measurement of thyroglobulin and antithyroglobulin antibody levels 3 days after. FINDINGS: A stimulated thyroglobulin level was measured at 0.4 (compared with 0.3 on the prior 1/23/2012 measurement) with no evidence of antithyroglobulin antibodies. Small heterogeneous area of decreased echogenicity within the left thyroid lobe resection bed that is nonspecific and may reflect residual tissue or postsurgical changes, not substantially changed from 1/2016. Electronically Signed by: Herrera Em MD, 10/27/2017 5:17 PM   Result Narrative   EXAMINATION: US-THYROID / NECK     DATE OF EXAM:  10/27/2017 4:40 PM    DEMOGRAPHICS: 65years old Female     INDICATION:     C74: Malignant neoplasm of thyroid gland (HCC)     Reason for Exam:  Malignant neoplasm of thyroid gland ([address] COMPARISON: Thyroid ultrasound 1/4/2016    TECHNIQUE: Sonographic evaluation of the thyroid was performed. FINDINGS:         Right Lobe:  The right thyroid lobe is surgically absent.  Surgical bed appears normal.           Left Lobe:    The left thyroid lobe is surgically absent with a small heterogeneous hypoechoic area in the surgical bed that measures roughly 7 x 3 x 4 mm, not substantially changed from 4 x 6 x 4 mm on the prior study from 2016.           Isthmus:     Not visualized            Additional findings:   2 normal-appearing cervical lymph nodes are demonstrated. Past Medical History:   Diagnosis Date    Cancer of thyroid [facility]) 2007    Fracture 2018     Patient Active Problem List    Diagnosis Date Noted    Thyroid cancer ([address] 2018    Postoperative hypothyroidism 2018    Osteopenia 2018    Vitamin D deficiency 2018    Menopause 2018     Past Surgical History:   Procedure Laterality Date    CERVICAL FUSION      TOTAL THYROIDECTOMY  2017     History reviewed. No pertinent family history. Social History     Socioeconomic History    Marital status:       Spouse name: None    Number of children: None    Years of education: None    Highest education level: None   Occupational History    None   Social Needs    Financial resource strain: None    Food insecurity     Worry: None     Inability: None    Transportation needs     Medical: None     Non-medical: None   Tobacco Use    Smoking status: Former Smoker     Packs/day: 1.00     Years: 15.00     Pack years: 15.00     Types: Cigarettes     Last attempt to quit: 10/22/2000     Years since quittin.0    Smokeless tobacco: Never Used   Substance and Sexual Activity    Alcohol use: Yes     Comment: ocassionally     Drug use: No    Sexual activity: Never   Lifestyle    Physical activity     Days per week: None     Minutes per session: None    Stress: None   Relationships    Social connections     Talks on phone: None     Gets together: None     Attends [employer] service: None     Active member of club or organization: None     Attends meetings of clubs or organizations: None     Relationship status: None    Intimate partner violence     Fear of current or ex partner: None     Emotionally abused: None     Physically abused: None     Forced sexual activity: None   Other Topics Concern    None Social History Narrative    None     Current Outpatient Medications   Medication Sig Dispense Refill    SYNTHROID 150 MCG tablet 1 tablet 6 days a week, 1/2 tablet 1 day a week 90 tablet 1    vitamin D (D3-50) 74485 UNIT CAPS TAKE ONE CAPSULE BY MOUTH weekly 12 capsule 1    BYSTOLIC 5 MG tablet TAKE 1 TABLET BY MOUTH EVERY DAY AT NIGHT  1     No current facility-administered medications for this visit.       No Known Allergies  Family Status   Relation Name Status    Mother  Alive    Father  Alive    Sister  Alive    Brother  Alive    Son  Alive    Son  Alive       Review of Systems:  Constitutional: has fatigue, no fever, no recent weight gain, no recent weight loss, no changes in appetite  Eyes: no eye pain, no change in vision, no eye redness, no eye irritation, no double vision  Ears, nose, throat: no nasal congestion, no sore throat, no earache, no decrease in hearing, no hoarseness, no dry mouth, no sinus problems, no difficulty swallowing, no neck lumps, no dental problems, no mouth sores, no ringing in ears  Pulmonary: no shortness of breath, no wheezing, no dyspnea on exertion, no cough  Cardiovascular: no chest pain, no lower extremity edema, no orthopnea, no intermittent leg claudication, no palpitations  Gastrointestinal: no abdominal pain, no nausea, no vomiting, no diarrhea, no constipation, no heartburn, no bloating  Genitourinary: no dysuria, no urinary incontinence, no urinary hesitancy, no change in urinary frequency, no feelings of urinary urgency, no nocturia  Musculoskeletal: no joint swelling, no joint stiffness, no joint pain, no muscle cramps, no muscle pain, no bone pain  Integument/Breast: no hair loss, no skin rashes, no skin lesions, no itching, no dry skin, no breast pain, no breast mass, no skin hives, no skin discoloration, no nipple discharge  Neurological: no numbness, no tingling, no weakness, no confusion, no headaches, no dizziness, no fainting, no tremors, no decrease function    Lab Review:  Lab Results   Component Value Date    TSH 1.480 03/07/2020     No results found for: FREET4     ASSESSMENT/PLAN:    1. Thyroid cancer   Ordered neck US    Thyrogen stimulated scans on 1/24/2012 and 11/20/2016   Thyroglobulin <0.1. Thyroglobulin antibody <1.  - Anti-Thyroglobulin Antibody; Future  - Thyroglobulin; Future    2. Postoperative hypothyroidism  Synthroid 0.15 mg 6 days a week, 1/2 tablet 1 day a week  TSH 0.19-0.3-1.1-1.4-3.8-0.8  - T4, Free; Future  - TSH without Reflex; Future    3. Osteopenia, unspecified location  Worsening. Rib fracture. No smoking  No RA  No hip fracture in family  No long term steroid use  On alendronate before, had jaw pain and bone/joint pain. Does exercise program, does not want treatment, understand risks    4. Vitamin D deficiency  Vitamin D to 50,000 IU weekly  25 hydroxy vitamin D 60-41-36-41  Vitamin D 50,000 IU weekly  - Vitamin D 25 Hydroxy; Future    5. Menopause  Follow. 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  8/31/2005 I-131 Tx 97i. Obtained history from other than patient No    Vivian Sierra was counseled regarding symptoms of thyroid, osteopenia diagnosis, course and complications of disease if inadequately treated, side effects of medications, diagnosis, treatment options, and prognosis, risks, benefits, complications, and alternatives of treatment, labs, imaging and other studies and treatment targets and goals. She understands instructions and counseling. Return in about 4 months (around 2/22/2021) for thyroid problems.

## 2020-12-16 ENCOUNTER — TELEPHONE (OUTPATIENT)
Dept: ENDOCRINOLOGY | Age: 60
End: 2020-12-16

## 2021-01-02 ENCOUNTER — TELEPHONE (OUTPATIENT)
Dept: ENDOCRINOLOGY | Age: 61
End: 2021-01-02

## 2021-01-04 NOTE — TELEPHONE ENCOUNTER
Spoke with patient. She will repeat neck ultrasound in 4 to 6 months. Will discuss again during February appointment.

## 2021-02-23 ENCOUNTER — OFFICE VISIT (OUTPATIENT)
Dept: ENDOCRINOLOGY | Age: 61
End: 2021-02-23
Payer: COMMERCIAL

## 2021-02-23 VITALS
OXYGEN SATURATION: 98 % | HEART RATE: 72 BPM | BODY MASS INDEX: 21.33 KG/M2 | SYSTOLIC BLOOD PRESSURE: 136 MMHG | DIASTOLIC BLOOD PRESSURE: 80 MMHG | HEIGHT: 69 IN | WEIGHT: 144 LBS

## 2021-02-23 DIAGNOSIS — E89.0 POSTOPERATIVE HYPOTHYROIDISM: ICD-10-CM

## 2021-02-23 DIAGNOSIS — Z78.0 MENOPAUSE: ICD-10-CM

## 2021-02-23 DIAGNOSIS — M85.80 OSTEOPENIA, UNSPECIFIED LOCATION: ICD-10-CM

## 2021-02-23 DIAGNOSIS — E55.9 VITAMIN D DEFICIENCY: ICD-10-CM

## 2021-02-23 DIAGNOSIS — C73 THYROID CANCER (HCC): Primary | ICD-10-CM

## 2021-02-23 PROCEDURE — 99214 OFFICE O/P EST MOD 30 MIN: CPT | Performed by: INTERNAL MEDICINE

## 2021-02-23 NOTE — PROGRESS NOTES
SUBJECTIVE:  Mike Quintana is a 62 y.o. female who is here for hypothyroidism. 1. 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  8/31/2005 I-131 Tx 97mCi. History of obstructive symptoms: difficulty swallowing No, changes in voice/hoarseness No.  History of radiation to patient's neck: No  Resent iodine exposure: No  Family history includes no thyroid abnormalities. Family history of thyroid cancer: No    2. Postoperative hypothyroidism    This started in 2005. Patient was diagnosed with thyroid cancer. The problem has been unchanged. Patient started medication in 2005. Currently patient is on: Synthroid. Misses  0 doses a month. Current complaints: no fatigue. Has sweating at night. Had palpitations, resolved. Gets more sleep. 3. Osteopenia, unspecified location  Had rib fractures. Calcium in food, takes vitamin D.    4. Vitamin D deficiency  No bone pain. 5. Menopause  No hot flashes. THYROID ULTRASOUND, 12/15/2020 2:23 PM       HISTORY:      Reason for Exam:  Malignant neoplasm of thyroid gland ([facility]   H81: Malignant neoplasm of thyroid gland [facility])     .        COMPARISON: 10/27/2017       TECHNIQUE: Grayscale and limited color Doppler imaging.       FINDINGS:       RIGHT LOBE:  Absent tissue       LEFT LOBE:    Prior thyroidectomy. There is an echogenic 8 x 8 x 8 mm    nodule previously measuring 7 x 3 x 4 mm.       ISTHMUS:     Absent       There is no cervical adenopathy.                       [IMPRESSION]       1.  In the left thyroid bed, there is a nodule measuring 8 x 8 x 8 mm    which is mildly increased from prior and may be residual thyroid tissue or    thyroid lesion.       Electronically Signed by: Dick Leonardo MD, 12/15/2020 3:29 PM          Stable approximately 0.2 x 0.4 x 0.7 cm slightly hypoechoic questionable lesion in the left side of the thyroid bed. Status post total thyroidectomy.       This dictation was created with voice recognition software. While attempts have been made to review the dictation as it is transcribed, on occasion the spoken word can be misinterpreted by the technology leading to omissions or inappropriate words, phrases or sentences. Electronically Signed by: Ryan Smith MD, 12/27/2018 5:16 PM   Result Narrative   US-HEAD/NECK SOFT TISSUE [id number], 12/27/2018 2:17 PM    63 years, Female    HISTORY: C73: Malignant neoplasm of thyroid gland ([address]    .Reason for Exam:  Malignant neoplasm of thyroid gland ([address] COMPARISON: 10/27/2017 ultrasound thyroid gland. FINDINGS:  Status post total thyroidectomy. A small focal hypoechoic questionable lesion in the left side thyroid bed measuring approximately 0.2 x 0.4 x 0.7 cm. This appears unchanged dating back to the previous examination at which time it measured 0.3 x 0.4 x 0.7 cm. Stable Thyrogen stimulated antithyroglobulin level when compared to the prior 1/24/2012 study    Electronically Signed by: Jerry Rodriguez, 11/28/2016 11:19 AM   Result Narrative   NM-THYROGEN INJECTION [id number]    CLINICAL INDICATION: The patient is a 55-year-old female with a history of total thyroidectomy 6/30/2005 demonstrating multifocal papillary carcinoma with metastatic disease to lymph nodes, who received radioiodine ablation of a thyroid remnant on 8/31/2005 using 97 mCi of iodine-131. COMPARISON: Thyrogen stimulated thyroglobulin level 1/24/2012    TECHNIQUE: The patient received Thyrogen injections IM on day  1 and 2 followed by measurement of thyroglobulin and antithyroglobulin antibody levels 3 days after. FINDINGS: A stimulated thyroglobulin level was measured at 0.4 (compared with 0.3 on the prior 1/23/2012 measurement) with no evidence of antithyroglobulin antibodies.        Small heterogeneous area of decreased echogenicity within the left thyroid lobe resection bed that is nonspecific and may reflect residual tissue or postsurgical changes, not substantially changed from 1/2016. Electronically Signed by: Anai Lacey MD, 10/27/2017 5:17 PM   Result Narrative   EXAMINATION: US-THYROID / NECK     DATE OF EXAM:  10/27/2017 4:40 PM    DEMOGRAPHICS: 65years old Female     INDICATION:     C74: Malignant neoplasm of thyroid gland (HCC)     Reason for Exam:  Malignant neoplasm of thyroid gland ([facility] COMPARISON: Thyroid ultrasound 1/4/2016    TECHNIQUE: Sonographic evaluation of the thyroid was performed. FINDINGS:         Right Lobe:  The right thyroid lobe is surgically absent. Surgical bed appears normal.           Left Lobe:    The left thyroid lobe is surgically absent with a small heterogeneous hypoechoic area in the surgical bed that measures roughly 7 x 3 x 4 mm, not substantially changed from 4 x 6 x 4 mm on the prior study from 1/4/2016.           Isthmus:     Not visualized            Additional findings:   2 normal-appearing cervical lymph nodes are demonstrated. Past Medical History:   Diagnosis Date    Cancer of thyroid [facility]) 2007    Fracture 04/03/2018     Patient Active Problem List    Diagnosis Date Noted    Thyroid cancer ([facility] 04/20/2018    Postoperative hypothyroidism 04/20/2018    Osteopenia 04/20/2018    Vitamin D deficiency 04/20/2018    Menopause 04/20/2018     Past Surgical History:   Procedure Laterality Date    CERVICAL FUSION  2007    TOTAL THYROIDECTOMY  2017     History reviewed. No pertinent family history. Social History     Socioeconomic History    Marital status:       Spouse name: None    Number of children: None    Years of education: None    Highest education level: None   Occupational History    None   Social Needs    Financial resource strain: None    Food insecurity     Worry: None     Inability: None    Transportation needs     Medical: None     Non-medical: None   Tobacco Use    Smoking status: Former Smoker     Packs/day: 1.00     Years: 15.00     Pack years: 15.00 Types: Cigarettes     Quit date: 10/22/2000     Years since quittin.3    Smokeless tobacco: Never Used   Substance and Sexual Activity    Alcohol use: Yes     Comment: ocassionally     Drug use: No    Sexual activity: Never   Lifestyle    Physical activity     Days per week: None     Minutes per session: None    Stress: None   Relationships    Social connections     Talks on phone: None     Gets together: None     Attends [employer] service: None     Active member of club or organization: None     Attends meetings of clubs or organizations: None     Relationship status: None    Intimate partner violence     Fear of current or ex partner: None     Emotionally abused: None     Physically abused: None     Forced sexual activity: None   Other Topics Concern    None   Social History Narrative    None     Current Outpatient Medications   Medication Sig Dispense Refill    SYNTHROID 150 MCG tablet 1 tablet 6 days a week, 1/2 tablet 1 day a week 90 tablet 1    vitamin D (D3-50) 71135 UNIT CAPS TAKE ONE CAPSULE BY MOUTH weekly 12 capsule 1    BYSTOLIC 5 MG tablet TAKE 1 TABLET BY MOUTH EVERY DAY AT NIGHT  1     No current facility-administered medications for this visit.       No Known Allergies  Family Status   Relation Name Status    Mother  Alive    Father  Alive    Sister  Alive    Brother  Alive    Son  Alive    Son  Alive       Review of Systems:  Constitutional: has fatigue, no fever, no recent weight gain, no recent weight loss, no changes in appetite  Eyes: no eye pain, no change in vision, no eye redness, no eye irritation, no double vision  Ears, nose, throat: no nasal congestion, no sore throat, no earache, no decrease in hearing, no hoarseness, no dry mouth, no sinus problems, no difficulty swallowing, no neck lumps, no dental problems, no mouth sores, no ringing in ears  Pulmonary: no shortness of breath, no wheezing, no dyspnea on exertion, no cough  Cardiovascular: no chest pain, no lower extremity edema, no orthopnea, no intermittent leg claudication, no palpitations  Gastrointestinal: no abdominal pain, no nausea, no vomiting, no diarrhea, no constipation, no heartburn, no bloating  Genitourinary: no dysuria, no urinary incontinence, no urinary hesitancy, no change in urinary frequency, no feelings of urinary urgency, no nocturia  Musculoskeletal: no joint swelling, no joint stiffness, no joint pain, no muscle cramps, no muscle pain, no bone pain  Integument/Breast: no hair loss, no skin rashes, no skin lesions, no itching, no dry skin, no breast pain, no breast mass, no skin hives, no skin discoloration, no nipple discharge  Neurological: no numbness, no tingling, no weakness, no confusion, no headaches, no dizziness, no fainting, no tremors, no decrease in memory, no balance problems  Psychiatric: no anxiety, no depression, no insomnia  Hematologic/Lymphatic: no tendency for easy bleeding, no swollen lymph nodes, no tendency for easy bruising  Immunology: no seasonal allergies, no frequent infections, no frequent illnesses  Endocrine: no temperature intolerance, no hot flashes, no hand tremor    OBJECTIVE:   /80   Pulse 72   Ht 5' 9\" (1.753 m)   Wt 144 lb (65.3 kg)   SpO2 98%   BMI 21.27 kg/m²   Wt Readings from Last 3 Encounters:   02/23/21 144 lb (65.3 kg)   10/22/20 144 lb 12.8 oz (65.7 kg)   07/20/20 143 lb 3.2 oz (65 kg)       Physical Exam:  Constitutional: no acute distress, well appearing, well nourished  Psychiatric: oriented to person, place and time, judgement, insight and normal, recent and remote memory and intact and mood, affect are normal  Skin: skin and subcutaneous tissue is normal without mass, normal turgor  Head and Face: examination of head and face revealed no abnormalities  Eyes: no lid or conjunctival swelling, no erythema or discharge, pupils are normal, equal, round, and reactive to light  Ears/Nose: external inspection of ears and nose revealed no abnormalities, hearing is grossly normal  Oropharynx/Mouth/Face: lips, tongue and gums are normal with no lesions, the voice quality was normal  Neck: neck is supple and symmetric, with midline trachea and no masses, thyroid is absent  Lymphatics: normal cervical lymph nodes, normal supraclavicular nodes  Pulmonary: no increased work of breathing or signs of respiratory distress, lungs are clear to auscultation  Cardiovascular: normal heart rate and rhythm, normal S1 and S2, no murmurs and pedal pulses and 2+ bilaterally, No edema  Abdomen: abdomen is soft, non-tender with no masses  Musculoskeletal: normal gait and station, exam of the digits and nails are normal  Neurological: normal coordination, normal general cortical function    Lab Review:  Lab Results   Component Value Date    TSH 0.579 02/10/2021     No results found for: FREET4     ASSESSMENT/PLAN:    1. Thyroid cancer   Worsening, increased thyroid tissue in thyroid bed  Referred to consult Dr. Joshua [place]  1.  In the left thyroid bed, there is a nodule measuring 8 x 8 x 8 mm    which is mildly increased from prior and may be residual thyroid tissue or    thyroid lesion.       Electronically Signed by: Narayan Jackson MD, 12/15/2020 3:29 PM     Thyrogen stimulated scans on 1/24/2012 and 11/20/2016   Thyroglobulin <0.1-0.1  Thyroglobulin antibody <1.  - Anti-Thyroglobulin Antibody; Future  - Thyroglobulin; Future    2. Postoperative hypothyroidism  Synthroid 0.15 mg 6 days a week, 1/2 tablet 1 day a week  TSH 0.19-0.3-1.1-1.4-3.8-0.8-0.5  - T4, Free; Future  - TSH without Reflex; Future    3. Osteopenia, unspecified location  Worsening. Rib fracture. No smoking  No RA  No hip fracture in family  No long term steroid use  On alendronate before, had jaw pain and bone/joint pain. Does exercise program, does not want treatment, understand risks    4.  Vitamin D deficiency  Vitamin D to 50,000 IU weekly  25 hydroxy vitamin D 46-05-81-41-35  Vitamin D 50,000

## 2021-05-10 DIAGNOSIS — E89.0 POSTOPERATIVE HYPOTHYROIDISM: ICD-10-CM

## 2021-05-11 RX ORDER — LEVOTHYROXINE SODIUM 150 MCG
TABLET ORAL
Qty: 90 TABLET | Refills: 1 | Status: SHIPPED | OUTPATIENT
Start: 2021-05-11 | End: 2021-06-24

## 2021-06-24 ENCOUNTER — OFFICE VISIT (OUTPATIENT)
Dept: ENDOCRINOLOGY | Age: 61
End: 2021-06-24
Payer: COMMERCIAL

## 2021-06-24 VITALS
HEIGHT: 68 IN | BODY MASS INDEX: 21.67 KG/M2 | HEART RATE: 57 BPM | SYSTOLIC BLOOD PRESSURE: 128 MMHG | OXYGEN SATURATION: 98 % | DIASTOLIC BLOOD PRESSURE: 80 MMHG | WEIGHT: 143 LBS

## 2021-06-24 DIAGNOSIS — C73 THYROID CANCER (HCC): Primary | ICD-10-CM

## 2021-06-24 DIAGNOSIS — E55.9 VITAMIN D DEFICIENCY: ICD-10-CM

## 2021-06-24 DIAGNOSIS — M85.80 OSTEOPENIA, UNSPECIFIED LOCATION: ICD-10-CM

## 2021-06-24 DIAGNOSIS — Z78.0 MENOPAUSE: ICD-10-CM

## 2021-06-24 DIAGNOSIS — E89.0 POSTOPERATIVE HYPOTHYROIDISM: ICD-10-CM

## 2021-06-24 PROCEDURE — 99214 OFFICE O/P EST MOD 30 MIN: CPT | Performed by: INTERNAL MEDICINE

## 2021-06-24 RX ORDER — METHOCARBAMOL 750 MG/1
TABLET ORAL
Qty: 12 CAPSULE | Refills: 1 | Status: SHIPPED | OUTPATIENT
Start: 2021-06-24 | End: 2021-10-07

## 2021-06-24 RX ORDER — LEVOTHYROXINE SODIUM 150 MCG
TABLET ORAL
Qty: 90 TABLET | Refills: 1 | Status: SHIPPED | OUTPATIENT
Start: 2021-06-24 | End: 2021-10-26 | Stop reason: ALTCHOICE

## 2021-06-24 NOTE — PROGRESS NOTES
created with voice recognition software. While attempts have been made to review the dictation as it is transcribed, on occasion the spoken word can be misinterpreted by the technology leading to omissions or inappropriate words, phrases or sentences. Electronically Signed by: Ulisses Higgins MD, 12/27/2018 5:16 PM   Result Narrative   US-HEAD/NECK SOFT TISSUE [id number], 12/27/2018 2:17 PM    63 years, Female    HISTORY: C73: Malignant neoplasm of thyroid gland ([address]    .Reason for Exam:  Malignant neoplasm of thyroid gland ([address] COMPARISON: 10/27/2017 ultrasound thyroid gland. FINDINGS:  Status post total thyroidectomy. A small focal hypoechoic questionable lesion in the left side thyroid bed measuring approximately 0.2 x 0.4 x 0.7 cm. This appears unchanged dating back to the previous examination at which time it measured 0.3 x 0.4 x 0.7 cm. 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 COMPARISON: Thyrogen stimulated thyroglobulin level 1/24/2012    TECHNIQUE: The patient received Thyrogen injections IM on day  1 and 2 followed by measurement of thyroglobulin and antithyroglobulin antibody levels 3 days after. FINDINGS: A stimulated thyroglobulin level was measured at 0.4 (compared with 0.3 on the prior 1/23/2012 measurement) with no evidence of antithyroglobulin antibodies.        Small heterogeneous area of decreased echogenicity within the left thyroid lobe resection bed that is nonspecific and may reflect residual tissue or postsurgical changes, not substantially changed from 2016. Electronically Signed by: Elio Auguste MD, 10/27/2017 5:17 PM   Result Narrative   EXAMINATION: US-THYROID / NECK     DATE OF EXAM:  10/27/2017 4:40 PM    DEMOGRAPHICS: 65years old Female     INDICATION:     C74: Malignant neoplasm of thyroid gland ([facility])     Reason for Exam:  Malignant neoplasm of thyroid gland ([facility] [address] COMPARISON: Thyroid ultrasound 2016    TECHNIQUE: Sonographic evaluation of the thyroid was performed. FINDINGS:         Right Lobe:  The right thyroid lobe is surgically absent. Surgical bed appears normal.           Left Lobe:    The left thyroid lobe is surgically absent with a small heterogeneous hypoechoic area in the surgical bed that measures roughly 7 x 3 x 4 mm, not substantially changed from 4 x 6 x 4 mm on the prior study from 2016.           Isthmus:     Not visualized            Additional findings:   2 normal-appearing cervical lymph nodes are demonstrated. Past Medical History:   Diagnosis Date    Cancer of thyroid [facility]) 2007    Fracture 2018     Patient Active Problem List    Diagnosis Date Noted    Thyroid cancer ([facility] 2018    Postoperative hypothyroidism 2018    Osteopenia 2018    Vitamin D deficiency 2018    Menopause 2018     Past Surgical History:   Procedure Laterality Date    CERVICAL FUSION      TOTAL THYROIDECTOMY       History reviewed. No pertinent family history. Social History     Socioeconomic History    Marital status:       Spouse name: None    Number of children: None    Years of education: None    Highest education level: None   Occupational History    None   Tobacco Use    Smoking status: Former Smoker     Packs/day: 1.00     Years: 15.00     Pack years: 15.00     Types: Cigarettes     Quit date: 10/22/2000     Years since quittin.6    Smokeless tobacco: Never Used   Vaping Use    Vaping Use: Never used   Substance and Sexual Activity    Alcohol use: Yes     Comment: ocassionally     Drug use: No    Sexual activity: Never   Other Topics Concern    None   Social History Narrative    None     Social Determinants of Health     Financial Resource Strain:     Difficulty of Paying Living Expenses:    Food Insecurity:     Worried About Running Out of Food in the Last Year:     Ran Out of Food in the Last Year:    Transportation Needs:     Lack of Transportation (Medical):  Lack of Transportation (Non-Medical):    Physical Activity:     Days of Exercise per Week:     Minutes of Exercise per Session:    Stress:     Feeling of Stress :    Social Connections:     Frequency of Communication with Friends and Family:     Frequency of Social Gatherings with Friends and Family:     Attends Synagogue Services:     Active Member of Clubs or Organizations:     Attends Club or Organization Meetings:     Marital Status:    Intimate Partner Violence:     Fear of Current or Ex-Partner:     Emotionally Abused:     Physically Abused:     Sexually Abused:      Current Outpatient Medications   Medication Sig Dispense Refill    vitamin D (D3-50) 91531 UNIT CAPS TAKE ONE CAPSULE BY MOUTH weekly 12 capsule 1    SYNTHROID 150 MCG tablet TAKE 1 TABLET BY MOUTH 6 DAYS A WEEK AND 1/2 TABLET 1 DAY A WEEK 90 tablet 1    BYSTOLIC 5 MG tablet TAKE 1 TABLET BY MOUTH EVERY DAY AT NIGHT  1     No current facility-administered medications for this visit.      No Known Allergies  Family Status   Relation Name Status    Mother  Alive    Father  Alive    Sister  Alive    Brother  Alive    Son  Alive    Son  Alive       Review of Systems:  Constitutional: has fatigue, no fever, no recent weight gain, no recent weight loss, no changes in appetite  Eyes: no eye pain, no change in vision, no eye redness, no eye irritation, no double vision  Ears, nose, throat: no nasal congestion, no sore throat, no earache, no decrease in hearing, no hoarseness, no dry mouth, no sinus problems, no difficulty swallowing, no neck lumps, no dental problems, no mouth sores, no ringing in ears  Pulmonary: no shortness of breath, no wheezing, no dyspnea on exertion, no cough  Cardiovascular: no chest pain, no lower extremity edema, no orthopnea, no intermittent leg claudication, no palpitations  Gastrointestinal: no abdominal pain, no nausea, no vomiting, no diarrhea, no constipation, no heartburn, no bloating  Genitourinary: no dysuria, no urinary incontinence, no urinary hesitancy, no change in urinary frequency, no feelings of urinary urgency, no nocturia  Musculoskeletal: no joint swelling, no joint stiffness, no joint pain, no muscle cramps, no muscle pain, no bone pain  Integument/Breast: no hair loss, no skin rashes, no skin lesions, no itching, no dry skin, no breast pain, no breast mass, no skin hives, no skin discoloration, no nipple discharge  Neurological: no numbness, no tingling, no weakness, no confusion, no headaches, no dizziness, no fainting, no tremors, no decrease in memory, no balance problems  Psychiatric: no anxiety, no depression, no insomnia  Hematologic/Lymphatic: no tendency for easy bleeding, no swollen lymph nodes, no tendency for easy bruising  Immunology: no seasonal allergies, no frequent infections, no frequent illnesses  Endocrine: no temperature intolerance, no hot flashes, no hand tremor    OBJECTIVE:   /80   Pulse 57   Ht 5' 8\" (1.727 m)   Wt 143 lb (64.9 kg)   SpO2 98%   BMI 21.74 kg/m²   Wt Readings from Last 3 Encounters:   06/24/21 143 lb (64.9 kg)   02/23/21 144 lb (65.3 kg)   10/22/20 144 lb 12.8 oz (65.7 kg)       Physical Exam:  Constitutional: no acute distress, well appearing, well nourished  Psychiatric: oriented to person, place and time, judgement, insight and normal, recent and remote memory and intact and mood, affect are normal  Skin: skin and subcutaneous tissue is normal without mass, normal turgor  Head and Face: examination of head and face revealed no abnormalities  Eyes: no lid or conjunctival swelling, no erythema or discharge, pupils are normal, equal, round, and reactive to light  Ears/Nose: external inspection of ears and nose revealed no abnormalities, hearing is grossly normal  Oropharynx/Mouth/Face: lips, tongue and gums are normal with no lesions, the voice quality was normal  Neck: neck is supple and symmetric, with midline trachea and no masses, thyroid is absent  Lymphatics: normal cervical lymph nodes, normal supraclavicular nodes  Pulmonary: no increased work of breathing or signs of respiratory distress, lungs are clear to auscultation  Cardiovascular: normal heart rate and rhythm, normal S1 and S2, no murmurs and pedal pulses and 2+ bilaterally, No edema  Abdomen: abdomen is soft, non-tender with no masses  Musculoskeletal: normal gait and station, exam of the digits and nails are normal  Neurological: normal coordination, normal general cortical function    Lab Review:  Lab Results   Component Value Date    TSH 0.228 06/14/2021     No results found for: FREET4     ASSESSMENT/PLAN:    1. Thyroid cancer     Dr. Ned Landeros consultation 2/24/2021:  Ill-defined left subcentimeters paratracheal possible remnant versus lymph node 0.3 x 0.3 x 0.5 cm  Benign-appearing cervical lymph nodes bilaterally  No evidence of disease    12/15/2020 thyroid ultrasound  1.  In the left thyroid bed, there is a nodule measuring 8 x 8 x 8 mm    which is mildly increased from prior and may be residual thyroid tissue or    thyroid lesion.       Electronically Signed by: Selvin Bravo MD, 12/15/2020 3:29 PM     Thyrogen stimulated scans on 1/24/2012 and 11/20/2016   Thyroglobulin <0.1-0.1-<0.1  Thyroglobulin antibody <1<1.0  - Anti-Thyroglobulin Antibody; Future  - Thyroglobulin; Future    2.  Postoperative hypothyroidism  Continue same dose adjust next darian if needed  Synthroid 0.15 mg 6 days a week, 1/2 tablet 1 day a week  TSH 0.19-0.3-1.1-1.4-3.8-0.8-0.5-0.228  - T4, Free; Future  - TSH without Reflex; Future    3. Osteopenia, unspecified location  DEXA  Worsening. Rib fracture. No smoking  No RA  No hip fracture in family  No long term steroid use  On alendronate before, had jaw pain and bone/joint pain. Does exercise program, does not want treatment, understand risks    4. Vitamin D deficiency  Vitamin D to 50,000 IU weekly  25 hydroxy vitamin D [id number]  Vitamin D 50,000 IU weekly  - Vitamin D 25 Hydroxy; Future    5. Menopause  Follow. 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  8/31/2005 I-131 Tx 97mCi. Obtained history from other than patient No    Kia Weiner was counseled regarding symptoms of thyroid, osteopenia, thyroid cancer diagnosis, course and complications of disease if inadequately treated, side effects of medications, diagnosis, treatment options, and prognosis, risks, benefits, complications, and alternatives of treatment, labs, imaging and other studies and treatment targets and goals, risk of thyroid cancer recurrence, evaluation. She understands instructions and counseling. Return in about 4 months (around 10/24/2021) for thyroid problems.

## 2021-10-07 DIAGNOSIS — E55.9 VITAMIN D DEFICIENCY: ICD-10-CM

## 2021-10-07 RX ORDER — METHOCARBAMOL 750 MG/1
TABLET ORAL
Qty: 12 CAPSULE | Refills: 1 | Status: SHIPPED | OUTPATIENT
Start: 2021-10-07 | End: 2022-09-26

## 2021-10-07 NOTE — TELEPHONE ENCOUNTER
Medication:   Requested Prescriptions     Pending Prescriptions Disp Refills    vitamin D (D3-50) 59124 UNIT CAPS [Pharmacy Med Name: VITAMIN D3-50 50,000 UNIT CAP] 12 capsule 1     Sig: TAKE 1 CAPSULE BY MOUTH ONE TIME PER WEEK       Last Filled:  6/24/21    Patient Phone Number: [phone] (home)     Last appt: 6/24/2021   Next appt: 10/26/2021    Last Labs DM:   Lab Results   Component Value Date    VITD25 41 06/14/2021    VITD25 35 02/10/2021

## 2021-10-26 ENCOUNTER — OFFICE VISIT (OUTPATIENT)
Dept: ENDOCRINOLOGY | Age: 61
End: 2021-10-26
Payer: COMMERCIAL

## 2021-10-26 VITALS
WEIGHT: 141 LBS | TEMPERATURE: 98 F | DIASTOLIC BLOOD PRESSURE: 80 MMHG | BODY MASS INDEX: 21.37 KG/M2 | OXYGEN SATURATION: 98 % | HEART RATE: 60 BPM | HEIGHT: 68 IN | SYSTOLIC BLOOD PRESSURE: 124 MMHG | RESPIRATION RATE: 14 BRPM

## 2021-10-26 DIAGNOSIS — E55.9 VITAMIN D DEFICIENCY: ICD-10-CM

## 2021-10-26 DIAGNOSIS — M85.80 OSTEOPENIA, UNSPECIFIED LOCATION: ICD-10-CM

## 2021-10-26 DIAGNOSIS — E89.0 POSTOPERATIVE HYPOTHYROIDISM: ICD-10-CM

## 2021-10-26 DIAGNOSIS — Z78.0 MENOPAUSE: ICD-10-CM

## 2021-10-26 DIAGNOSIS — C73 THYROID CANCER (HCC): Primary | ICD-10-CM

## 2021-10-26 PROCEDURE — 99214 OFFICE O/P EST MOD 30 MIN: CPT | Performed by: INTERNAL MEDICINE

## 2021-10-26 RX ORDER — LEVOTHYROXINE SODIUM 137 MCG
137 TABLET ORAL
Qty: 90 TABLET | Refills: 1 | Status: SHIPPED | OUTPATIENT
Start: 2021-10-26 | End: 2022-03-28 | Stop reason: SDUPTHER

## 2021-10-26 NOTE — PROGRESS NOTES
SUBJECTIVE:  Babs Guevara is a 62 y.o. female who is here for hypothyroidism. 1. 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  8/31/2005 I-131 Tx 97mCi. History of obstructive symptoms: difficulty swallowing No, changes in voice/hoarseness No.  History of radiation to patient's neck: No  Resent iodine exposure: No  Family history includes no thyroid abnormalities. Family history of thyroid cancer: No    2. Postoperative hypothyroidism    This started in 2005. Patient was diagnosed with thyroid cancer. The problem has been unchanged. Patient started medication in 2005. Currently patient is on: Synthroid. Misses  0 doses a month. Current complaints: no fatigue. Has sweating at night. Had palpitations, resolved. Gets more sleep. 3. Osteopenia, unspecified location  Had rib fractures. Calcium in food, takes vitamin D.    4. Vitamin D deficiency  No bone pain. 5. Menopause  No hot flashes. THYROID ULTRASOUND, 12/15/2020 2:23 PM       HISTORY:      Reason for Exam:  Malignant neoplasm of thyroid gland ([facility] [address]   S18: Malignant neoplasm of thyroid gland [facility])     .        COMPARISON: 10/27/2017       TECHNIQUE: Grayscale and limited color Doppler imaging.       FINDINGS:       RIGHT LOBE:  Absent tissue       LEFT LOBE:    Prior thyroidectomy. There is an echogenic 8 x 8 x 8 mm    nodule previously measuring 7 x 3 x 4 mm.       ISTHMUS:     Absent       There is no cervical adenopathy.                       [IMPRESSION]       1.  In the left thyroid bed, there is a nodule measuring 8 x 8 x 8 mm    which is mildly increased from prior and may be residual thyroid tissue or    thyroid lesion.       Electronically Signed by: Ovidio Batres MD, 12/15/2020 3:29 PM          Stable approximately 0.2 x 0.4 x 0.7 cm slightly hypoechoic questionable lesion in the left side of the thyroid bed. Status post total thyroidectomy.       This dictation was created with voice recognition software. While attempts have been made to review the dictation as it is transcribed, on occasion the spoken word can be misinterpreted by the technology leading to omissions or inappropriate words, phrases or sentences. Electronically Signed by: Hugo Ordonez MD, 12/27/2018 5:16 PM   Result Narrative   US-HEAD/NECK SOFT TISSUE [id number], 12/27/2018 2:17 PM    63 years, Female    HISTORY: C73: Malignant neoplasm of thyroid gland ([address]    .Reason for Exam:  Malignant neoplasm of thyroid gland ([address] COMPARISON: 10/27/2017 ultrasound thyroid gland. FINDINGS:  Status post total thyroidectomy. A small focal hypoechoic questionable lesion in the left side thyroid bed measuring approximately 0.2 x 0.4 x 0.7 cm. This appears unchanged dating back to the previous examination at which time it measured 0.3 x 0.4 x 0.7 cm. Stable Thyrogen stimulated antithyroglobulin level when compared to the prior 1/24/2012 study    Electronically Signed by: Chauncey Dudley, 11/28/2016 11:19 AM   Result Narrative   NM-THYROGEN INJECTION [id number]    CLINICAL INDICATION: The patient is a 71-year-old female with a history of total thyroidectomy 6/30/2005 demonstrating multifocal papillary carcinoma with metastatic disease to lymph nodes, who received radioiodine ablation of a thyroid remnant on 8/31/2005 using 97 mCi of iodine-131. COMPARISON: Thyrogen stimulated thyroglobulin level 1/24/2012    TECHNIQUE: The patient received Thyrogen injections IM on day  1 and 2 followed by measurement of thyroglobulin and antithyroglobulin antibody levels 3 days after. FINDINGS: A stimulated thyroglobulin level was measured at 0.4 (compared with 0.3 on the prior 1/23/2012 measurement) with no evidence of antithyroglobulin antibodies.        Small heterogeneous area of decreased echogenicity within the left thyroid lobe resection bed that is nonspecific and may reflect residual tissue or postsurgical changes, not substantially changed from 1/2016. Electronically Signed by: Moustapha Reveles MD, 10/27/2017 5:17 PM   Result Narrative   EXAMINATION: US-THYROID / NECK     DATE OF EXAM:  10/27/2017 4:40 PM    DEMOGRAPHICS: 65years old Female     INDICATION:     C74: Malignant neoplasm of thyroid gland ([facility])     Reason for Exam:  Malignant neoplasm of thyroid gland ([address] COMPARISON: Thyroid ultrasound 1/4/2016    TECHNIQUE: Sonographic evaluation of the thyroid was performed. FINDINGS:         Right Lobe:  The right thyroid lobe is surgically absent. Surgical bed appears normal.           Left Lobe:    The left thyroid lobe is surgically absent with a small heterogeneous hypoechoic area in the surgical bed that measures roughly 7 x 3 x 4 mm, not substantially changed from 4 x 6 x 4 mm on the prior study from 1/4/2016.           Isthmus:     Not visualized            Additional findings:   2 normal-appearing cervical lymph nodes are demonstrated. PATIENT WILL HAND CARRY ORDER   BD-BONE DENSITY DEXA AXIAL SKELETON, 7/26/2021 9:42 AM       INDICATION:     M85.80: Other specified disorders of bone density and    structure, unspecified site            DEMOGRAPHICS: 62years old Female        COMPARISON: 7/23/2019       TECHNIQUE : The patient's lumbar spine and hips were scanned with    dual-energy x-ray absorptiometry (DXA) utilizing a GE Lunar Prodigy    [id number] unit.            Lumbar spine: Analysis of L1-L4 shows a bone mineral density of 0.946     g/cm2.  This correlates with the T-score of -2.0.       Left Hip: Analysis of the femoral neck shows a bone mineral density of    0.752  g/cm2.  This correlates with a T-score of -2. 1.       Left Hip: Analysis of the total hip shows a bone mineral density of 0.720     g/cm2.  This correlates with a T-score of -2. 3.       Right Hip: Analysis of the femoral neck shows a bone mineral density of    0.731  g/cm2.  This correlates with a T-score of -2.2.        Right Hip: Analysis of the right total hip shows a bone mineral density of    0.733  g/cm2.  This correlates with a T score of -2.2.                  The FRAX 10 year probability for a major osteoporotic fracture is 16.8 %    and the probability of fracture at the hip is 2.8 %.         T score interpretation according to the [address] [place] [address]:       T-score at or above  -1.0  Normal       T-score  between  -1.0  and  -2.5  osteopenia       T-score at or below  -2.5   osteoporosis       [IMPRESSION]   1.  Low bone mass (osteopenia).  The bone mineral density is compatible    with low bone mass (osteopenia) and may represent increased risk of    fracture. 2.  The FRAX 10 year probability for a major osteoporotic fracture as    above. 3.  Without LSC reported, quantitative comparative analysis not possible    at this time, however osteopenia present on last examination            Past Medical History:   Diagnosis Date    Cancer of thyroid ([facility] [address] 2007    Fracture 2018     Patient Active Problem List    Diagnosis Date Noted    [facility] 2018    Postoperative hypothyroidism 2018    Osteopenia 2018    Vitamin D deficiency 2018    Menopause 2018     Past Surgical History:   Procedure Laterality Date    CERVICAL FUSION      TOTAL THYROIDECTOMY       History reviewed. No pertinent family history. Social History     Socioeconomic History    Marital status:       Spouse name: None    Number of children: None    Years of education: None    Highest education level: None   Occupational History    None   Tobacco Use    Smoking status: Former Smoker     Packs/day: 1.00     Years: 15.00     Pack years: 15.00     Types: Cigarettes     Quit date: 10/22/2000     Years since quittin.0    Smokeless tobacco: Never Used    Tobacco comment: quit in    Vaping Use    Vaping Use: Never used   Substance and Sexual Activity    Alcohol use: Not problems, no difficulty swallowing, no neck lumps, no dental problems, no mouth sores, no ringing in ears  Pulmonary: no shortness of breath, no wheezing, no dyspnea on exertion, no cough  Cardiovascular: no chest pain, no lower extremity edema, no orthopnea, no intermittent leg claudication, no palpitations  Gastrointestinal: no abdominal pain, no nausea, no vomiting, no diarrhea, no constipation, no heartburn, no bloating  Genitourinary: no dysuria, no urinary incontinence, no urinary hesitancy, no change in urinary frequency, no feelings of urinary urgency, no nocturia  Musculoskeletal: no joint swelling, no joint stiffness, no joint pain, no muscle cramps, no muscle pain, no bone pain  Integument/Breast: no hair loss, no skin rashes, no skin lesions, no itching, no dry skin, no breast pain, no breast mass, no skin hives, no skin discoloration, no nipple discharge  Neurological: no numbness, no tingling, no weakness, no confusion, no headaches, no dizziness, no fainting, no tremors, no decrease in memory, no balance problems  Psychiatric: no anxiety, no depression, no insomnia  Hematologic/Lymphatic: no tendency for easy bleeding, no swollen lymph nodes, no tendency for easy bruising  Immunology: no seasonal allergies, no frequent infections, no frequent illnesses  Endocrine: no temperature intolerance, no hot flashes, no hand tremor    OBJECTIVE:   /80   Pulse 60   Temp 98 °F (36.7 °C)   Resp 14   Ht 5' 8\" (1.727 m)   Wt 141 lb (64 kg)   SpO2 98%   BMI 21.44 kg/m²   Wt Readings from Last 3 Encounters:   10/26/21 141 lb (64 kg)   06/24/21 143 lb (64.9 kg)   02/23/21 144 lb (65.3 kg)       Physical Exam:  Constitutional: no acute distress, well appearing, well nourished  Psychiatric: oriented to person, place and time, judgement, insight and normal, recent and remote memory and intact and mood, affect are normal  Skin: skin and subcutaneous tissue is normal without mass, normal turgor  Head and Face: examination of head and face revealed no abnormalities  Eyes: no lid or conjunctival swelling, no erythema or discharge, pupils are normal, equal, round, and reactive to light  Ears/Nose: external inspection of ears and nose revealed no abnormalities, hearing is grossly normal  Oropharynx/Mouth/Face: lips, tongue and gums are normal with no lesions, the voice quality was normal  Neck: neck is supple and symmetric, with midline trachea and no masses, thyroid is absent  Lymphatics: normal cervical lymph nodes, normal supraclavicular nodes  Pulmonary: no increased work of breathing or signs of respiratory distress, lungs are clear to auscultation  Cardiovascular: normal heart rate and rhythm, normal S1 and S2, no murmurs and pedal pulses and 2+ bilaterally, No edema  Abdomen: abdomen is soft, non-tender with no masses  Musculoskeletal: normal gait and station, exam of the digits and nails are normal  Neurological: normal coordination, normal general cortical function    Lab Review:  Lab Results   Component Value Date    TSH 0.203 10/12/2021     No results found for: FREET4     ASSESSMENT/PLAN:    1. Thyroid cancer     Dr. Alli Herrera consultation 2/24/2021:  Ill-defined left subcentimeters paratracheal possible remnant versus lymph node 0.3 x 0.3 x 0.5 cm  Benign-appearing cervical lymph nodes bilaterally  No evidence of disease    12/15/2020 thyroid ultrasound  1.  In the left thyroid bed, there is a nodule measuring 8 x 8 x 8 mm    which is mildly increased from prior and may be residual thyroid tissue or    thyroid lesion.       Electronically Signed by: Zulma Guzman MD, 12/15/2020 3:29 PM     Thyrogen stimulated scans on 1/24/2012 and 11/20/2016   Thyroglobulin <0.1-0.1-<0.1-0.1  Thyroglobulin antibody <1<1.0<1.0  - Anti-Thyroglobulin Antibody; Future  - Thyroglobulin; Future    2.  Postoperative hypothyroidism  Uncontrolled  Decrease Synthroid to 0.137 mg qd  TSH 0.19-0.3-1.1-1.4-3.8-0.8-0.5-0.228-0.203  - T4, Free; Future  - TSH without Reflex; Future    3. Osteopenia, unspecified location  Calcium 9.9  DEXA  Direct comparison is not possible because of the different devices  T scores similar  Rib fracture. No smoking  No RA  No hip fracture in family  No long term steroid use  On alendronate before, had jaw pain and bone/joint pain. Does exercise program, does not want treatment, understand risks    4. Vitamin D deficiency  Calcium 9.9  Vitamin D to 50,000 IU weekly  25 hydroxy vitamin D [id number]  Vitamin D 50,000 IU weekly  - Vitamin D 25 Hydroxy; Future    5. Menopause  Follow. 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  8/31/2005 I-131 Tx 97mCi. Obtained history from other than patient No    Jono Stevens was counseled regarding symptoms of thyroid, osteopenia, thyroid cancer diagnosis, course and complications of disease if inadequately treated, side effects of medications, diagnosis, treatment options, and prognosis, risks, benefits, complications, and alternatives of treatment, labs, imaging and other studies and treatment targets and goals, risk of thyroid cancer recurrence, evaluation. She understands instructions and counseling. Return in about 5 months (around 3/26/2022) for thyroid problems.

## 2022-03-28 ENCOUNTER — OFFICE VISIT (OUTPATIENT)
Dept: ENDOCRINOLOGY | Age: 62
End: 2022-03-28
Payer: COMMERCIAL

## 2022-03-28 VITALS
SYSTOLIC BLOOD PRESSURE: 148 MMHG | OXYGEN SATURATION: 98 % | BODY MASS INDEX: 22.13 KG/M2 | HEART RATE: 65 BPM | HEIGHT: 68 IN | WEIGHT: 146 LBS | DIASTOLIC BLOOD PRESSURE: 90 MMHG | TEMPERATURE: 98 F | RESPIRATION RATE: 14 BRPM

## 2022-03-28 DIAGNOSIS — E55.9 VITAMIN D DEFICIENCY: ICD-10-CM

## 2022-03-28 DIAGNOSIS — Z78.0 MENOPAUSE: ICD-10-CM

## 2022-03-28 DIAGNOSIS — C73 THYROID CANCER (HCC): Primary | ICD-10-CM

## 2022-03-28 DIAGNOSIS — E89.0 POSTOPERATIVE HYPOTHYROIDISM: ICD-10-CM

## 2022-03-28 DIAGNOSIS — M85.80 OSTEOPENIA, UNSPECIFIED LOCATION: ICD-10-CM

## 2022-03-28 PROCEDURE — 99214 OFFICE O/P EST MOD 30 MIN: CPT | Performed by: INTERNAL MEDICINE

## 2022-03-28 RX ORDER — LEVOTHYROXINE SODIUM 137 MCG
137 TABLET ORAL
Qty: 90 TABLET | Refills: 1 | Status: SHIPPED | OUTPATIENT
Start: 2022-03-28 | End: 2022-07-01 | Stop reason: SDUPTHER

## 2022-03-28 NOTE — PROGRESS NOTES
SUBJECTIVE:  Aneta Metz is a 62 y.o. female who is here for hypothyroidism. 1. 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  8/31/2005 I-131 Tx 97mCi. History of obstructive symptoms: difficulty swallowing No, changes in voice/hoarseness No.  History of radiation to patient's neck: No  Resent iodine exposure: No  Family history includes no thyroid abnormalities. Family history of thyroid cancer: No    2. Postoperative hypothyroidism    This started in 2005. Patient was diagnosed with thyroid cancer. The problem has been unchanged. Patient started medication in 2005. Currently patient is on: Synthroid. Misses  0 doses a month. Current complaints: no fatigue. Has sweating at night. Had palpitations, resolved. Gets more sleep. 3. Osteopenia, unspecified location  Had rib fractures. Calcium in food, takes vitamin D.    4. Vitamin D deficiency  No bone pain. 5. Menopause  No hot flashes. THYROID ULTRASOUND, 12/15/2020 2:23 PM       HISTORY:      Reason for Exam:  Malignant neoplasm of thyroid gland ([facility] [address]   Y24: Malignant neoplasm of thyroid gland [facility])     .        COMPARISON: 10/27/2017       TECHNIQUE: Grayscale and limited color Doppler imaging.       FINDINGS:       RIGHT LOBE:  Absent tissue       LEFT LOBE:    Prior thyroidectomy. There is an echogenic 8 x 8 x 8 mm    nodule previously measuring 7 x 3 x 4 mm.       ISTHMUS:     Absent       There is no cervical adenopathy.                       [IMPRESSION]       1.  In the left thyroid bed, there is a nodule measuring 8 x 8 x 8 mm    which is mildly increased from prior and may be residual thyroid tissue or    thyroid lesion.       Electronically Signed by: Suzanna Lo MD, 12/15/2020 3:29 PM          Stable approximately 0.2 x 0.4 x 0.7 cm slightly hypoechoic questionable lesion in the left side of the thyroid bed. Status post total thyroidectomy.       This dictation was created with voice recognition software. While attempts have been made to review the dictation as it is transcribed, on occasion the spoken word can be misinterpreted by the technology leading to omissions or inappropriate words, phrases or sentences. Electronically Signed by: Rain Aguirre MD, 12/27/2018 5:16 PM   Result Narrative   US-HEAD/NECK SOFT TISSUE [id number], 12/27/2018 2:17 PM    63 years, Female    HISTORY: C73: Malignant neoplasm of thyroid gland ([address]    .Reason for Exam:  Malignant neoplasm of thyroid gland ([address] COMPARISON: 10/27/2017 ultrasound thyroid gland. FINDINGS:  Status post total thyroidectomy. A small focal hypoechoic questionable lesion in the left side thyroid bed measuring approximately 0.2 x 0.4 x 0.7 cm. This appears unchanged dating back to the previous examination at which time it measured 0.3 x 0.4 x 0.7 cm. Stable Thyrogen stimulated antithyroglobulin level when compared to the prior 1/24/2012 study    Electronically Signed by: Lashon López, 11/28/2016 11:19 AM   Result Narrative   NM-THYROGEN INJECTION [id number]    CLINICAL INDICATION: The patient is a 71-year-old female with a history of total thyroidectomy 6/30/2005 demonstrating multifocal papillary carcinoma with metastatic disease to lymph nodes, who received radioiodine ablation of a thyroid remnant on 8/31/2005 using 97 mCi of iodine-131. COMPARISON: Thyrogen stimulated thyroglobulin level 1/24/2012    TECHNIQUE: The patient received Thyrogen injections IM on day  1 and 2 followed by measurement of thyroglobulin and antithyroglobulin antibody levels 3 days after. FINDINGS: A stimulated thyroglobulin level was measured at 0.4 (compared with 0.3 on the prior 1/23/2012 measurement) with no evidence of antithyroglobulin antibodies.        Small heterogeneous area of decreased echogenicity within the left thyroid lobe resection bed that is nonspecific and may reflect residual tissue or postsurgical changes, not substantially changed from 1/2016. Electronically Signed by: Kellie Vaz MD, 10/27/2017 5:17 PM   Result Narrative   EXAMINATION: US-THYROID / NECK     DATE OF EXAM:  10/27/2017 4:40 PM    DEMOGRAPHICS: 65years old Female     INDICATION:     C74: Malignant neoplasm of thyroid gland ([facility])     Reason for Exam:  Malignant neoplasm of thyroid gland ([address] COMPARISON: Thyroid ultrasound 1/4/2016    TECHNIQUE: Sonographic evaluation of the thyroid was performed. FINDINGS:         Right Lobe:  The right thyroid lobe is surgically absent. Surgical bed appears normal.           Left Lobe:    The left thyroid lobe is surgically absent with a small heterogeneous hypoechoic area in the surgical bed that measures roughly 7 x 3 x 4 mm, not substantially changed from 4 x 6 x 4 mm on the prior study from 1/4/2016.           Isthmus:     Not visualized            Additional findings:   2 normal-appearing cervical lymph nodes are demonstrated. PATIENT WILL HAND CARRY ORDER   BD-BONE DENSITY DEXA AXIAL SKELETON, 7/26/2021 9:42 AM       INDICATION:     M85.80: Other specified disorders of bone density and    structure, unspecified site            DEMOGRAPHICS: 62years old Female        COMPARISON: 7/23/2019       TECHNIQUE : The patient's lumbar spine and hips were scanned with    dual-energy x-ray absorptiometry (DXA) utilizing a GE Lunar Prodigy    [id number] unit.            Lumbar spine: Analysis of L1-L4 shows a bone mineral density of 0.946     g/cm2.  This correlates with the T-score of -2.0.       Left Hip: Analysis of the femoral neck shows a bone mineral density of    0.752  g/cm2.  This correlates with a T-score of -2. 1.       Left Hip: Analysis of the total hip shows a bone mineral density of 0.720     g/cm2.  This correlates with a T-score of -2. 3.       Right Hip: Analysis of the femoral neck shows a bone mineral density of    0.731  g/cm2.  This correlates with a T-score of -2.2.        Right Hip: Analysis of the right total hip shows a bone mineral density of    0.733  g/cm2.  This correlates with a T score of -2.2.                  The FRAX 10 year probability for a major osteoporotic fracture is 16.8 %    and the probability of fracture at the hip is 2.8 %.         T score interpretation according to the [address] [place] [address]:       T-score at or above  -1.0  Normal       T-score  between  -1.0  and  -2.5  osteopenia       T-score at or below  -2.5   osteoporosis       [IMPRESSION]   1.  Low bone mass (osteopenia).  The bone mineral density is compatible    with low bone mass (osteopenia) and may represent increased risk of    fracture. 2.  The FRAX 10 year probability for a major osteoporotic fracture as    above. 3.  Without LSC reported, quantitative comparative analysis not possible    at this time, however osteopenia present on last examination            Past Medical History:   Diagnosis Date    Cancer of thyroid ([facility] [address] 2007    Fracture 2018     Patient Active Problem List    Diagnosis Date Noted    [facility]    Postoperative hypothyroidism 2018    Osteopenia 2018    Vitamin D deficiency 2018    Menopause 2018     Past Surgical History:   Procedure Laterality Date    CERVICAL FUSION      TOTAL THYROIDECTOMY       History reviewed. No pertinent family history. Social History     Socioeconomic History    Marital status:       Spouse name: None    Number of children: None    Years of education: None    Highest education level: None   Occupational History    None   Tobacco Use    Smoking status: Former Smoker     Packs/day: 1.00     Years: 15.00     Pack years: 15.00     Types: Cigarettes     Quit date: 10/22/2000     Years since quittin.4    Smokeless tobacco: Never Used    Tobacco comment: quit in    Vaping Use    Vaping Use: Never used   Substance and Sexual Activity    Alcohol use: Not Currently     Comment: ocassionally     Drug use: No    Sexual activity: Never   Other Topics Concern    None   Social History Narrative    None     Social Determinants of Health     Financial Resource Strain:     Difficulty of Paying Living Expenses: Not on file   Food Insecurity:     Worried About Running Out of Food in the Last Year: Not on file    Earnest of Food in the Last Year: Not on file   Transportation Needs:     Lack of Transportation (Medical): Not on file    Lack of Transportation (Non-Medical): Not on file   Physical Activity:     Days of Exercise per Week: Not on file    Minutes of Exercise per Session: Not on file   Stress:     Feeling of Stress : Not on file   Social Connections:     Frequency of Communication with Friends and Family: Not on file    Frequency of Social Gatherings with Friends and Family: Not on file    Attends Restoration Services: Not on file    Active Member of [address] [place] [address] [employer] or Organizations: Not on file    Attends Club or Organization Meetings: Not on file    Marital Status: Not on file   Intimate Partner Violence:     Fear of Current or Ex-Partner: Not on file    Emotionally Abused: Not on file    Physically Abused: Not on file    Sexually Abused: Not on file   Housing Stability:     Unable to Pay for Housing in the Last Year: Not on file    Number of [place] in the Last Year: Not on file    Unstable Housing in the Last Year: Not on file     Current Outpatient Medications   Medication Sig Dispense Refill    SYNTHROID 137 MCG tablet Take 1 tablet by mouth every morning (before breakfast) 90 tablet 1    vitamin D (D3-50) 97798 UNIT CAPS TAKE 1 CAPSULE BY MOUTH ONE TIME PER WEEK 12 capsule 1    BYSTOLIC 5 MG tablet TAKE 1 TABLET BY MOUTH EVERY DAY AT NIGHT  1     No current facility-administered medications for this visit.      No Known Allergies  Family Status   Relation Name Status    Mother  Alive    Father  Alive    Sister  Alive    Brother  Alive    Son  [address] Son  Alive       Review of Systems:  Constitutional: has fatigue, no fever, no recent weight gain, no recent weight loss, no changes in appetite  Eyes: no eye pain, no change in vision, no eye redness, no eye irritation, no double vision  Ears, nose, throat: no nasal congestion, no sore throat, no earache, no decrease in hearing, no hoarseness, no dry mouth, no sinus problems, no difficulty swallowing, no neck lumps, no dental problems, no mouth sores, no ringing in ears  Pulmonary: no shortness of breath, no wheezing, no dyspnea on exertion, no cough  Cardiovascular: no chest pain, no lower extremity edema, no orthopnea, no intermittent leg claudication, no palpitations  Gastrointestinal: no abdominal pain, no nausea, no vomiting, no diarrhea, no constipation, no heartburn, no bloating  Genitourinary: no dysuria, no urinary incontinence, no urinary hesitancy, no change in urinary frequency, no feelings of urinary urgency, no nocturia  Musculoskeletal: no joint swelling, no joint stiffness, no joint pain, no muscle cramps, no muscle pain, no bone pain  Integument/Breast: no hair loss, no skin rashes, no skin lesions, no itching, no dry skin, no breast pain, no breast mass, no skin hives, no skin discoloration, no nipple discharge  Neurological: no numbness, no tingling, no weakness, no confusion, no headaches, no dizziness, no fainting, no tremors, no decrease in memory, no balance problems  Psychiatric: no anxiety, no depression, no insomnia  Hematologic/Lymphatic: no tendency for easy bleeding, no swollen lymph nodes, no tendency for easy bruising  Immunology: no seasonal allergies, no frequent infections, no frequent illnesses  Endocrine: no temperature intolerance, no hot flashes, no hand tremor    OBJECTIVE:   BP (!) 164/96   Pulse 65   Temp 98 °F (36.7 °C)   Resp 14   Ht 5' 8\" (1.727 m)   Wt 146 lb (66.2 kg)   SpO2 98%   BMI 22.20 kg/m²   Wt Readings from Last 3 Encounters:   03/28/22 146 lb (66.2 kg)   10/26/21 141 lb (64 kg)   06/24/21 143 lb (64.9 kg)       Physical Exam:  Constitutional: no acute distress, well appearing, well nourished  Psychiatric: oriented to person, place and time, judgement, insight and normal, recent and remote memory and intact and mood, affect are normal  Skin: skin and subcutaneous tissue is normal without mass, normal turgor  Head and Face: examination of head and face revealed no abnormalities  Eyes: no lid or conjunctival swelling, no erythema or discharge, pupils are normal, equal, round, and reactive to light  Ears/Nose: external inspection of ears and nose revealed no abnormalities, hearing is grossly normal  Oropharynx/Mouth/Face: lips, tongue and gums are normal with no lesions, the voice quality was normal  Neck: neck is supple and symmetric, with midline trachea and no masses, thyroid is absent  Lymphatics: normal cervical lymph nodes, normal supraclavicular nodes  Pulmonary: no increased work of breathing or signs of respiratory distress, lungs are clear to auscultation  Cardiovascular: normal heart rate and rhythm, normal S1 and S2, no murmurs and pedal pulses and 2+ bilaterally, No edema  Abdomen: abdomen is soft, non-tender with no masses  Musculoskeletal: normal gait and station, exam of the digits and nails are normal  Neurological: normal coordination, normal general cortical function    Lab Review:  Lab Results   Component Value Date    TSH 0.700 03/16/2022     No results found for: FREET4     ASSESSMENT/PLAN:    1.  Thyroid cancer   3/9/2022 Dr. Delta Martinez consultation:  Absent thyroid  Stable left subcm paratracheal likely LN 0.3x0.3x0.6cm (prior 0.5cm)  Benign appearing cervical lymph nodes bilaterally  No Evidence of Disease  2/24/2021 Dr. Delta Martinez consultation:  Ill-defined left subcentimeters paratracheal possible remnant versus lymph node 0.3 x 0.3 x 0.5 cm  Benign-appearing cervical lymph nodes bilaterally  No evidence of disease    12/15/2020 thyroid ultrasound  1.  In the left thyroid bed, there is a nodule measuring 8 x 8 x 8 mm    which is mildly increased from prior and may be residual thyroid tissue or    thyroid lesion.       Electronically Signed by: Melissa Potter MD, 12/15/2020 3:29 PM     Thyrogen stimulated scans on 1/24/2012 and 11/20/2016   Thyroglobulin <0.1-0.1-<0.1-0.1-<0.1  Thyroglobulin antibody <1<1.0<1.0-<1.0  - Anti-Thyroglobulin Antibody; Future  - Thyroglobulin; Future    2. Postoperative hypothyroidism  Continue Synthroid to 0.137 mg qd  TSH 0.19-0.3-1.1-1.4-3.8-0.8-0.5-0.228-0.203-0.7  - T4, Free; Future  - TSH without Reflex; Future    3. Osteopenia, unspecified location  Calcium 9.9  DEXA 7/2021  Direct comparison is not possible because of the different devices  T-scores similar  Rib fracture. No smoking  No RA  No hip fracture in family  No long term steroid use  On alendronate before, had jaw pain and bone/joint pain. Does exercise program, does not want treatment, understand risks    4. Vitamin D deficiency  Calcium 9.9  Vitamin D to 50,000 IU weekly  25 hydroxy vitamin D [id number]  Vitamin D 50,000 IU weekly  - Vitamin D 25 Hydroxy; Future    5. Menopause  Follow. 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  8/31/2005 I-131 Tx 97mCi.   Obtained history from other than patient No    hCen Mariano was counseled regarding symptoms of thyroid, osteopenia, thyroid cancer diagnosis, course and complications of disease if inadequately treated, side effects of medications, diagnosis, treatment options, and prognosis, risks, benefits, complications, and alternatives of treatment, labs, imaging and other studies and treatment targets and goals, risk of thyroid cancer recurrence, evaluation. She understands instructions and counseling. Return in about 6 months (around 9/28/2022) for thyroid problems.

## 2022-06-30 DIAGNOSIS — E89.0 POSTOPERATIVE HYPOTHYROIDISM: ICD-10-CM

## 2022-07-01 ENCOUNTER — TELEPHONE (OUTPATIENT)
Dept: ENDOCRINOLOGY | Age: 62
End: 2022-07-01

## 2022-07-01 DIAGNOSIS — C73 THYROID CANCER (HCC): ICD-10-CM

## 2022-07-01 DIAGNOSIS — E89.0 POSTOPERATIVE HYPOTHYROIDISM: ICD-10-CM

## 2022-07-01 RX ORDER — LEVOTHYROXINE SODIUM 137 MCG
137 TABLET ORAL
Qty: 90 TABLET | Refills: 0 | Status: SHIPPED | OUTPATIENT
Start: 2022-07-01 | End: 2022-09-26 | Stop reason: ALTCHOICE

## 2022-07-01 RX ORDER — LEVOTHYROXINE SODIUM 150 MCG
TABLET ORAL
Qty: 90 TABLET | Refills: 1 | OUTPATIENT
Start: 2022-07-01

## 2022-07-01 NOTE — TELEPHONE ENCOUNTER
Submitted PA for Synthroid 137MCG tablets Key: [id number]    Via CMM STATUS: Available without authorization.

## 2022-07-01 NOTE — TELEPHONE ENCOUNTER
This pt is requesting an Urgent PA as she is getting ready to leave out of town and is almost out of her medication.     Please assist (:

## 2022-07-01 NOTE — TELEPHONE ENCOUNTER
Spoke w/ pharmacist. They only had generic order and did not have march order. Resent rx. Pt informed.

## 2022-09-13 LAB
ALBUMIN/GLOBULIN RATIO: 2.5 RATIO (ref 0.8–2.6)
ALBUMIN: 4.7 G/DL (ref 3.5–5.2)
ALP BLD-CCNC: 90 U/L (ref 23–144)
ALT SERPL-CCNC: 11 U/L (ref 0–60)
AST SERPL-CCNC: 20 U/L (ref 0–55)
BILIRUB SERPL-MCNC: 0.5 MG/DL (ref 0–1.2)
BUN BLDV-MCNC: 12 MG/DL (ref 3–29)
BUN/CREAT BLD: 20 (ref 7–25)
CALCIUM SERPL-MCNC: 9.8 MG/DL (ref 8.5–10.5)
CHLORIDE BLD-SCNC: 104 MEQ/L (ref 96–110)
CO2: 28 MEQ/L (ref 19–32)
CREAT SERPL-MCNC: 0.6 MG/DL (ref 0.5–1.2)
GLOBULIN: 1.9 G/DL (ref 1.9–3.6)
GLOMERULAR FILTRATION RATE: 103 MLS/MIN/1.73M2
GLUCOSE BLD-MCNC: 89 MG/DL (ref 70–99)
POTASSIUM SERPL-SCNC: 4.1 MEQ/L (ref 3.4–5.3)
SODIUM BLD-SCNC: 142 MEQ/L (ref 135–148)
STATUS: NORMAL
T3 FREE: 3.2 PG/ML (ref 2.3–4.2)
T4 FREE: 1.96 NG/DL (ref 0.8–1.8)
TOTAL PROTEIN: 6.6 G/DL (ref 6–8.3)
TSH SERPL DL<=0.05 MIU/L-ACNC: 0.18 MCIU/ML (ref 0.4–4.5)
VITAMIN D 25-HYDROXY: 70 NG/ML (ref 30–100)

## 2022-09-14 LAB — ANTI-THYROGLOB ABS: 0.1

## 2022-09-17 LAB
THYROGLOBULIN ANTIBODY: <1 IU/ML
THYROGLOBULIN: 0.1 NG/ML (ref 2.8–40.9)

## 2022-09-26 ENCOUNTER — OFFICE VISIT (OUTPATIENT)
Dept: ENDOCRINOLOGY | Age: 62
End: 2022-09-26
Payer: COMMERCIAL

## 2022-09-26 VITALS
HEART RATE: 64 BPM | DIASTOLIC BLOOD PRESSURE: 80 MMHG | WEIGHT: 141 LBS | BODY MASS INDEX: 21.37 KG/M2 | TEMPERATURE: 98 F | HEIGHT: 68 IN | OXYGEN SATURATION: 98 % | RESPIRATION RATE: 14 BRPM | SYSTOLIC BLOOD PRESSURE: 138 MMHG

## 2022-09-26 DIAGNOSIS — C73 THYROID CANCER (HCC): Primary | ICD-10-CM

## 2022-09-26 DIAGNOSIS — E55.9 VITAMIN D DEFICIENCY: ICD-10-CM

## 2022-09-26 DIAGNOSIS — E89.0 POSTOPERATIVE HYPOTHYROIDISM: ICD-10-CM

## 2022-09-26 DIAGNOSIS — M85.80 OSTEOPENIA, UNSPECIFIED LOCATION: ICD-10-CM

## 2022-09-26 DIAGNOSIS — Z78.0 MENOPAUSE: ICD-10-CM

## 2022-09-26 DIAGNOSIS — C73 THYROID CANCER (HCC): ICD-10-CM

## 2022-09-26 PROCEDURE — 99214 OFFICE O/P EST MOD 30 MIN: CPT | Performed by: INTERNAL MEDICINE

## 2022-09-26 RX ORDER — LEVOTHYROXINE SODIUM 137 MCG
137 TABLET ORAL
Qty: 90 TABLET | Refills: 1 | Status: CANCELLED | OUTPATIENT
Start: 2022-09-26

## 2022-09-26 RX ORDER — LEVOTHYROXINE SODIUM 125 MCG
125 TABLET ORAL
Qty: 90 TABLET | Refills: 1 | Status: SHIPPED | OUTPATIENT
Start: 2022-09-26

## 2022-09-26 RX ORDER — METHOCARBAMOL 750 MG/1
TABLET ORAL
Qty: 12 CAPSULE | Refills: 1
Start: 2022-09-26 | End: 2022-10-31 | Stop reason: SDUPTHER

## 2022-09-26 RX ORDER — LEVOTHYROXINE SODIUM 137 MCG
TABLET ORAL
Qty: 90 TABLET | Refills: 0 | OUTPATIENT
Start: 2022-09-26

## 2022-09-26 NOTE — PROGRESS NOTES
SUBJECTIVE:  Wally Nelson is a 62 y.o. female who is here for hypothyroidism. 1. 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  8/31/2005 I-131 Tx 97mCi. History of obstructive symptoms: difficulty swallowing No, changes in voice/hoarseness No.  History of radiation to patient's neck: No  Resent iodine exposure: No  Family history includes no thyroid abnormalities. Family history of thyroid cancer: No    2. Postoperative hypothyroidism    This started in 2005. Patient was diagnosed with thyroid cancer. The problem has been unchanged. Patient started medication in 2005. Currently patient is on: Synthroid. Misses  0 doses a month. Current complaints: no fatigue. Has sweating at night. Had palpitations, resolved. Gets more sleep. 3. Osteopenia, unspecified location  Had rib fractures. Calcium in food, takes vitamin D.    4. Vitamin D deficiency  No bone pain. 5. Menopause  No hot flashes. THYROID ULTRASOUND, 12/15/2020 2:23 PM       HISTORY:      Reason for Exam:  Malignant neoplasm of thyroid gland ([address] C73: Malignant neoplasm of thyroid gland ([address]     . COMPARISON: 10/27/2017       TECHNIQUE: Grayscale and limited color Doppler imaging. FINDINGS:       RIGHT LOBE:  Absent tissue       LEFT LOBE:    Prior thyroidectomy. There is an echogenic 8 x 8 x 8 mm    nodule previously measuring 7 x 3 x 4 mm. ISTHMUS:     Absent       There is no cervical adenopathy. [IMPRESSION]       1. In the left thyroid bed, there is a nodule measuring 8 x 8 x 8 mm    which is mildly increased from prior and may be residual thyroid tissue or    thyroid lesion. Electronically Signed by: Rosamaria Gordillo MD, 12/15/2020 3:29 PM          Stable approximately 0.2 x 0.4 x 0.7 cm slightly hypoechoic questionable lesion in the left side of the thyroid bed. Status post total thyroidectomy.       This dictation was created with voice recognition software. While attempts have been made to review the dictation as it is transcribed, on occasion the spoken word can be misinterpreted by the technology leading to omissions or inappropriate words, phrases or sentences. Electronically Signed by: Dayanara Ferrara MD, 12/27/2018 5:16 PM   Result Narrative   US-HEAD/NECK SOFT TISSUE [id number], 12/27/2018 2:17 PM    63 years, Female    HISTORY: C73: Malignant neoplasm of thyroid gland ([address] .Reason for Exam:  Malignant neoplasm of thyroid gland ([address] COMPARISON: 10/27/2017 ultrasound thyroid gland. FINDINGS:  Status post total thyroidectomy. A small focal hypoechoic questionable lesion in the left side thyroid bed measuring approximately 0.2 x 0.4 x 0.7 cm. This appears unchanged dating back to the previous examination at which time it measured 0.3 x 0.4 x 0.7 cm. Stable Thyrogen stimulated antithyroglobulin level when compared to the prior 1/24/2012 study    Electronically Signed by: Skylar Peña, 11/28/2016 11:19 AM   Result Narrative   NM-THYROGEN INJECTION [id number]    CLINICAL INDICATION: The patient is a 55-year-old female with a history of total thyroidectomy 6/30/2005 demonstrating multifocal papillary carcinoma with metastatic disease to lymph nodes, who received radioiodine ablation of a thyroid remnant on 8/31/2005 using 97 mCi of iodine-131. COMPARISON: Thyrogen stimulated thyroglobulin level 1/24/2012    TECHNIQUE: The patient received Thyrogen injections IM on day  1 and 2 followed by measurement of thyroglobulin and antithyroglobulin antibody levels 3 days after. FINDINGS: A stimulated thyroglobulin level was measured at 0.4 (compared with 0.3 on the prior 1/23/2012 measurement) with no evidence of antithyroglobulin antibodies.        Small heterogeneous area of decreased echogenicity within the left thyroid lobe resection bed that is nonspecific and may reflect residual tissue or postsurgical changes, not substantially changed from 1/2016. Electronically Signed by: Lucy May MD, 10/27/2017 5:17 PM   Result Narrative   EXAMINATION: US-THYROID / NECK     DATE OF EXAM:  10/27/2017 4:40 PM    DEMOGRAPHICS: 65years old Female     INDICATION:     C73: Malignant neoplasm of thyroid gland [facility])     Reason for Exam:  Malignant neoplasm of thyroid gland ([address] COMPARISON: Thyroid ultrasound 1/4/2016    TECHNIQUE: Sonographic evaluation of the thyroid was performed. FINDINGS:         Right Lobe: The right thyroid lobe is surgically absent. Surgical bed appears normal.           Left Lobe: The left thyroid lobe is surgically absent with a small heterogeneous hypoechoic area in the surgical bed that measures roughly 7 x 3 x 4 mm, not substantially changed from 4 x 6 x 4 mm on the prior study from 1/4/2016. Isthmus:     Not visualized            Additional findings:   2 normal-appearing cervical lymph nodes are demonstrated. PATIENT WILL HAND CARRY ORDER   BD-BONE DENSITY DEXA AXIAL SKELETON, 7/26/2021 9:42 AM       INDICATION:     M85.80: Other specified disorders of bone density and    structure, unspecified site            DEMOGRAPHICS: 62years old Female        COMPARISON: 7/23/2019       TECHNIQUE : The patient's lumbar spine and hips were scanned with    dual-energy x-ray absorptiometry (DXA) utilizing a [employer]+[id number] unit. Lumbar spine: Analysis of L1-L4 shows a bone mineral density of 0.946     g/cm2. This correlates with the T-score of -2.0. Left Hip: Analysis of the femoral neck shows a bone mineral density of    0.752  g/cm2. This correlates with a T-score of -2.1. Left Hip: Analysis of the total hip shows a bone mineral density of 0.720     g/cm2. This correlates with a T-score of -2.3. Right Hip: Analysis of the femoral neck shows a bone mineral density of    0.731  g/cm2. This correlates with a T-score of -2.2.         Right Hip: Analysis of the right total hip shows a bone mineral density of    0.733  g/cm2. This correlates with a T score of -2.2. The FRAX 10 year probability for a major osteoporotic fracture is 16.8 %    and the probability of fracture at the hip is 2.8 %. T score interpretation according to the [address] [place] [address]:       T-score at or above  -1.0  Normal       T-score  between  -1.0  and  -2.5  osteopenia       T-score at or below  -2.5   osteoporosis       [IMPRESSION]   1. Low bone mass (osteopenia). The bone mineral density is compatible    with low bone mass (osteopenia) and may represent increased risk of    fracture. 2.  The FRAX 10 year probability for a major osteoporotic fracture as    above. 3.  Without LSC reported, quantitative comparative analysis not possible    at this time, however osteopenia present on last examination            Past Medical History:   Diagnosis Date    Cancer of thyroid ([facility] 2007    Fracture 2018     Patient Active Problem List    Diagnosis Date Noted    Th[facility]roid cancer ([facility] 2018    Postoperative hypothyroidism 2018    Osteopenia 2018    Vitamin D deficiency 2018    Menopause 2018     Past Surgical History:   Procedure Laterality Date    CERVICAL FUSION      TOTAL THYROIDECTOMY       History reviewed. No pertinent family history. Social History     Socioeconomic History    Marital status:      Spouse name: None    Number of children: None    Years of education: None    Highest education level: None   Tobacco Use    Smoking status: Former     Packs/day: 1.00     Years: 15.00     Pack years: 15.00     Types: Cigarettes     Quit date: 10/22/2000     Years since quittin.9    Smokeless tobacco: Never    Tobacco comments:     quit in    Vaping Use    Vaping Use: Never used   Substance and Sexual Activity    Alcohol use: Not Currently     Comment: ocassionally     Drug use:  No Sexual activity: Never     Current Outpatient Medications   Medication Sig Dispense Refill    SYNTHROID 125 MCG tablet Take 1 tablet by mouth every morning (before breakfast) 90 tablet 1    vitamin D (D3-50) 82457 UNIT CAPS TAKE 1 CAPSULE every 10 days 12 capsule 1    BYSTOLIC 5 MG tablet Generic Nebvilol  1     No current facility-administered medications for this visit.      No Known Allergies  Family Status   Relation Name Status    Mother  Alive    Father  Alive    Sister  Alive    Brother  Alive    Son  Alive    Son  Alive       Review of Systems:  Constitutional: has fatigue, no fever, no recent weight gain, no recent weight loss, no changes in appetite  Eyes: no eye pain, no change in vision, no eye redness, no eye irritation, no double vision  Ears, nose, throat: no nasal congestion, no sore throat, no earache, no decrease in hearing, no hoarseness, no dry mouth, no sinus problems, no difficulty swallowing, no neck lumps, no dental problems, no mouth sores, no ringing in ears  Pulmonary: no shortness of breath, no wheezing, no dyspnea on exertion, no cough  Cardiovascular: no chest pain, no lower extremity edema, no orthopnea, no intermittent leg claudication, no palpitations  Gastrointestinal: no abdominal pain, no nausea, no vomiting, no diarrhea, no constipation, no heartburn, no bloating  Genitourinary: no dysuria, no urinary incontinence, no urinary hesitancy, no change in urinary frequency, no feelings of urinary urgency, no nocturia  Musculoskeletal: no joint swelling, no joint stiffness, no joint pain, no muscle cramps, no muscle pain, no bone pain  Integument/Breast: no hair loss, no skin rashes, no skin lesions, no itching, no dry skin, no breast pain, no breast mass, no skin hives, no skin discoloration, no nipple discharge  Neurological: no numbness, no tingling, no weakness, no confusion, no headaches, no dizziness, no fainting, no tremors, no decrease in memory, no balance problems  Psychiatric: no anxiety, no depression, no insomnia  Hematologic/Lymphatic: no tendency for easy bleeding, no swollen lymph nodes, no tendency for easy bruising  Immunology: no seasonal allergies, no frequent infections, no frequent illnesses  Endocrine: no temperature intolerance, no hot flashes, no hand tremor    OBJECTIVE:   /80   Pulse 64   Temp 98 °F (36.7 °C)   Resp 14   Ht 5' 8\" (1.727 m)   Wt 141 lb (64 kg)   SpO2 98%   BMI 21.44 kg/m²   Wt Readings from Last 3 Encounters:   09/26/22 141 lb (64 kg)   03/28/22 146 lb (66.2 kg)   10/26/21 141 lb (64 kg)       Physical Exam:  Constitutional: no acute distress, well appearing, well nourished  Psychiatric: oriented to person, place and time, judgement, insight and normal, recent and remote memory and intact and mood, affect are normal  Skin: skin and subcutaneous tissue is normal without mass, normal turgor  Head and Face: examination of head and face revealed no abnormalities  Eyes: no lid or conjunctival swelling, no erythema or discharge, pupils are normal, equal, round, and reactive to light  Ears/Nose: external inspection of ears and nose revealed no abnormalities, hearing is grossly normal  Oropharynx/Mouth/Face: lips, tongue and gums are normal with no lesions, the voice quality was normal  Neck: neck is supple and symmetric, with midline trachea and no masses, thyroid is absent  Lymphatics: normal cervical lymph nodes, normal supraclavicular nodes  Pulmonary: no increased work of breathing or signs of respiratory distress, lungs are clear to auscultation  Cardiovascular: normal heart rate and rhythm, normal S1 and S2, no murmurs and pedal pulses and 2+ bilaterally, No edema  Abdomen: abdomen is soft, non-tender with no masses  Musculoskeletal: normal gait and station, exam of the digits and nails are normal  Neurological: normal coordination, normal general cortical function    Lab Review:  Lab Results   Component Value Date/Time    TSH 0.184 09/13/2022 07:36 AM     No results found for: FREET4     ASSESSMENT/PLAN:    1. Thyroid cancer   3/9/2022 Dr. Karis Henriquez consultation:  Absent thyroid  Stable left subcm paratracheal likely LN 0.3x0.3x0.6cm (prior 0.5cm)  Benign appearing cervical lymph nodes bilaterally  No Evidence of Disease  2/24/2021 Dr. Karis Henriquez consultation:  Ill-defined left subcentimeters paratracheal possible remnant versus lymph node 0.3 x 0.3 x 0.5 cm  Benign-appearing cervical lymph nodes bilaterally  No evidence of disease    12/15/2020 thyroid ultrasound  1. In the left thyroid bed, there is a nodule measuring 8 x 8 x 8 mm    which is mildly increased from prior and may be residual thyroid tissue or    thyroid lesion. Electronically Signed by: Raven Fowler MD, 12/15/2020 3:29 PM     Thyrogen stimulated scans on 1/24/2012 and 11/20/2016   Thyroglobulin <0.1-0.1-<0.1-0.1-<0.1-0.1  Thyroglobulin antibody <1<1.0<1.0-<1.0-<1.0  - Anti-Thyroglobulin Antibody; Future  - Thyroglobulin; Future    2. Postoperative hypothyroidism  Uncontrolled  Decrease Synthroid to 0.125 mg qd  TSH 0.19-0.3-1.1-1.4-3.8-0.8-0.5-0.228-0.203-0.7-0.184  - T4, Free; Future  - TSH without Reflex; Future    3. Osteopenia, unspecified location  Calcium 9.9-9.8  DEXA 7/2021  Direct comparison is not possible because of the different devices  T-scores similar  Rib fracture. No smoking  No RA  No hip fracture in family  No long term steroid use  On alendronate before, had jaw pain and bone/joint pain. Does exercise program, does not want treatment, understand risks    4. Vitamin D deficiency  Calcium 9.9-9.8  Decreased Vitamin D 50,000 IU every 10 days  25 hydroxy vitamin D 81-01-45-38-12-06-59-70  Vitamin D 50,000 IU weekly  - Vitamin D 25 Hydroxy; Future    5. Menopause  Follow.     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  8/31/2005 I-131 Tx 97mCi. Obtained history from other than patient No    Jessica Baker was counseled regarding symptoms of thyroid, osteopenia, thyroid cancer diagnosis, course and complications of disease if inadequately treated, side effects of medications, diagnosis, treatment options, and prognosis, risks, benefits, complications, and alternatives of treatment, labs, imaging and other studies and treatment targets and goals, risk of thyroid cancer recurrence, evaluation. She understands instructions and counseling. Return in about 3 months (around 12/26/2022) for thyroid problems.

## 2022-10-30 DIAGNOSIS — E55.9 VITAMIN D DEFICIENCY: ICD-10-CM

## 2022-10-31 ENCOUNTER — TELEPHONE (OUTPATIENT)
Dept: ENDOCRINOLOGY | Age: 62
End: 2022-10-31

## 2022-10-31 DIAGNOSIS — E55.9 VITAMIN D DEFICIENCY: ICD-10-CM

## 2022-10-31 RX ORDER — METHOCARBAMOL 750 MG/1
TABLET ORAL
Qty: 12 CAPSULE | Refills: 1 | OUTPATIENT
Start: 2022-10-31

## 2022-10-31 RX ORDER — METHOCARBAMOL 750 MG/1
TABLET ORAL
Qty: 12 CAPSULE | Refills: 0 | Status: SHIPPED | OUTPATIENT
Start: 2022-10-31

## 2022-10-31 NOTE — TELEPHONE ENCOUNTER
Pt calling and states her refill was denied for her Vitamin D. Told pt it was denied because refill was too soon. Pt states they have  though.  Pt states she would like enough to last her until her next appt (3 months)    [facility] info [facility] [address] [facility]    CB# [phone]

## 2022-12-18 DIAGNOSIS — E89.0 POSTOPERATIVE HYPOTHYROIDISM: ICD-10-CM

## 2022-12-18 DIAGNOSIS — C73 THYROID CANCER (HCC): ICD-10-CM

## 2022-12-18 DIAGNOSIS — E55.9 VITAMIN D DEFICIENCY: ICD-10-CM

## 2022-12-19 RX ORDER — METHOCARBAMOL 750 MG/1
TABLET ORAL
Qty: 9 CAPSULE | Refills: 0 | Status: SHIPPED | OUTPATIENT
Start: 2022-12-19

## 2022-12-19 RX ORDER — LEVOTHYROXINE SODIUM 137 MCG
TABLET ORAL
Qty: 90 TABLET | Refills: 0 | OUTPATIENT
Start: 2022-12-19

## 2022-12-27 ENCOUNTER — PATIENT MESSAGE (OUTPATIENT)
Dept: ENDOCRINOLOGY | Age: 62
End: 2022-12-27

## 2022-12-27 ENCOUNTER — TELEPHONE (OUTPATIENT)
Dept: ENDOCRINOLOGY | Age: 62
End: 2022-12-27

## 2022-12-27 NOTE — TELEPHONE ENCOUNTER
Her insurance isnt covering brand name synthroid and she needs help with it. [facility] uses [facility] [place] on [address]. Her phone is [phone]    She has 7 synthroid left.

## 2022-12-27 NOTE — TELEPHONE ENCOUNTER
----- Message from Marlena Patino sent at 12/27/2022 11:02 AM EST -----  Regarding: Brand Synthoid Refill  [employer] has declined coverage of Synthroid. I called them and the prior authorization # is [id number] and their fax # is [phone]. Would you work with them to get Synthroid authorized for me? I have only 6 pills remaining so as soon as you possibly can. My mobile is [phone] if you need to contact me. Thank you!   Marlena Patino

## 2022-12-29 RX ORDER — LEVOTHYROXINE SODIUM 125 MCG
125 TABLET ORAL
Qty: 90 TABLET | Refills: 1 | OUTPATIENT
Start: 2022-12-29

## 2022-12-30 NOTE — TELEPHONE ENCOUNTER
From: King Lazcano  To: Dr. Kole Reaves: 12/27/2022 11:02 AM EST  Subject: Brand Synthoid Refill    [employer] has declined coverage of Synthroid. I called them and the prior authorization # is [id number] and their fax # is [phone]. Would you work with them to get Synthroid authorized for me? I have only 6 pills remaining so as soon as you possibly can. My mobile is [phone] if you need to contact me. Thank you!  King Lazcano

## 2023-01-09 LAB
ALBUMIN/GLOBULIN RATIO: 2 RATIO (ref 0.8–2.6)
ALBUMIN: 4.4 G/DL (ref 3.5–5.2)
ALP BLD-CCNC: 99 U/L (ref 23–144)
ALT SERPL-CCNC: 13 U/L (ref 0–60)
AST SERPL-CCNC: 23 U/L (ref 0–55)
BILIRUB SERPL-MCNC: 0.5 MG/DL (ref 0–1.2)
BUN BLDV-MCNC: 11 MG/DL (ref 3–29)
BUN/CREAT BLD: 18 (ref 7–25)
CALCIUM SERPL-MCNC: 9.7 MG/DL (ref 8.5–10.5)
CHLORIDE BLD-SCNC: 102 MEQ/L (ref 96–110)
CO2: 29 MEQ/L (ref 19–32)
CREAT SERPL-MCNC: 0.6 MG/DL (ref 0.5–1.2)
GLOBULIN: 2.2 G/DL (ref 1.9–3.6)
GLOMERULAR FILTRATION RATE: 102 MLS/MIN/1.73M2
GLUCOSE BLD-MCNC: 84 MG/DL (ref 70–99)
POTASSIUM SERPL-SCNC: 4.5 MEQ/L (ref 3.4–5.3)
SODIUM BLD-SCNC: 142 MEQ/L (ref 135–148)
STATUS: NORMAL
T3 FREE: 3 PG/ML (ref 2.3–4.2)
T4 FREE: 1.69 NG/DL (ref 0.8–1.8)
TOTAL PROTEIN: 6.6 G/DL (ref 6–8.3)
TSH SERPL DL<=0.05 MIU/L-ACNC: 0.94 MCIU/ML (ref 0.4–4.5)
VITAMIN D 25-HYDROXY: 62 NG/ML (ref 30–100)

## 2023-01-11 LAB — ANTI-THYROGLOB ABS: 0.17

## 2023-01-17 ENCOUNTER — OFFICE VISIT (OUTPATIENT)
Dept: ENDOCRINOLOGY | Age: 63
End: 2023-01-17
Payer: COMMERCIAL

## 2023-01-17 VITALS
SYSTOLIC BLOOD PRESSURE: 131 MMHG | OXYGEN SATURATION: 97 % | HEIGHT: 68 IN | WEIGHT: 141 LBS | DIASTOLIC BLOOD PRESSURE: 93 MMHG | HEART RATE: 71 BPM | RESPIRATION RATE: 14 BRPM | TEMPERATURE: 98 F | BODY MASS INDEX: 21.37 KG/M2

## 2023-01-17 DIAGNOSIS — E89.0 POSTOPERATIVE HYPOTHYROIDISM: ICD-10-CM

## 2023-01-17 DIAGNOSIS — E55.9 VITAMIN D DEFICIENCY: ICD-10-CM

## 2023-01-17 DIAGNOSIS — Z78.0 MENOPAUSE: ICD-10-CM

## 2023-01-17 DIAGNOSIS — M85.80 OSTEOPENIA, UNSPECIFIED LOCATION: ICD-10-CM

## 2023-01-17 DIAGNOSIS — C73 THYROID CANCER (HCC): Primary | ICD-10-CM

## 2023-01-17 PROCEDURE — 99214 OFFICE O/P EST MOD 30 MIN: CPT | Performed by: INTERNAL MEDICINE

## 2023-01-17 RX ORDER — METHOCARBAMOL 750 MG/1
TABLET ORAL
Qty: 9 CAPSULE | Refills: 1 | Status: SHIPPED | OUTPATIENT
Start: 2023-01-17

## 2023-01-17 RX ORDER — LEVOTHYROXINE SODIUM 125 MCG
125 TABLET ORAL
Qty: 90 TABLET | Refills: 1 | Status: SHIPPED | OUTPATIENT
Start: 2023-01-17

## 2023-01-17 NOTE — PROGRESS NOTES
SUBJECTIVE:  Aurea Cook is a 65 y.o. female who is here for hypothyroidism. 1. 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  8/31/2005 I-131 Tx 97mCi. History of obstructive symptoms: difficulty swallowing No, changes in voice/hoarseness No.  History of radiation to patient's neck: No  Resent iodine exposure: No  Family history includes no thyroid abnormalities. Family history of thyroid cancer: No    2. Postoperative hypothyroidism    This started in 2005. Patient was diagnosed with thyroid cancer. The problem has been unchanged. Patient started medication in 2005. Currently patient is on: Synthroid. Misses  0 doses a month. Current complaints: no fatigue. Has sweating at night. Had palpitations, resolved. Gets more sleep. 3. Osteopenia, unspecified location  Had rib fractures. Calcium in food, takes vitamin D.    4. Vitamin D deficiency  No bone pain. 5. Menopause  No hot flashes. THYROID ULTRASOUND, 12/15/2020 2:23 PM       HISTORY:      Reason for Exam:  Malignant neoplasm of thyroid gland ([address] C73: Malignant neoplasm of thyroid gland ([address]     . COMPARISON: 10/27/2017       TECHNIQUE: Grayscale and limited color Doppler imaging. FINDINGS:       RIGHT LOBE:  Absent tissue       LEFT LOBE:    Prior thyroidectomy. There is an echogenic 8 x 8 x 8 mm    nodule previously measuring 7 x 3 x 4 mm. ISTHMUS:     Absent       There is no cervical adenopathy. [IMPRESSION]       1. In the left thyroid bed, there is a nodule measuring 8 x 8 x 8 mm    which is mildly increased from prior and may be residual thyroid tissue or    thyroid lesion. Electronically Signed by: Modesta Mcghee MD, 12/15/2020 3:29 PM          Stable approximately 0.2 x 0.4 x 0.7 cm slightly hypoechoic questionable lesion in the left side of the thyroid bed. Status post total thyroidectomy.       This dictation was created with voice recognition software. While attempts have been made to review the dictation as it is transcribed, on occasion the spoken word can be misinterpreted by the technology leading to omissions or inappropriate words, phrases or sentences. Electronically Signed by: Khushbu Adler MD, 12/27/2018 5:16 PM   Result Narrative   US-HEAD/NECK SOFT TISSUE 40530, 12/27/2018 2:17 PM    63 years, Female    HISTORY: C73: Malignant neoplasm of thyroid gland ([address] .Reason for Exam:  Malignant neoplasm of thyroid gland ([address] COMPARISON: 10/27/2017 ultrasound thyroid gland. FINDINGS:  Status post total thyroidectomy. A small focal hypoechoic questionable lesion in the left side thyroid bed measuring approximately 0.2 x 0.4 x 0.7 cm. This appears unchanged dating back to the previous examination at which time it measured 0.3 x 0.4 x 0.7 cm. Stable Thyrogen stimulated antithyroglobulin level when compared to the prior 1/24/2012 study    Electronically Signed by: Marilia Potter, 11/28/2016 11:19 AM   Result Narrative   NM-THYROGEN INJECTION [id number]    CLINICAL INDICATION: The patient is a 71-year-old female with a history of total thyroidectomy 6/30/2005 demonstrating multifocal papillary carcinoma with metastatic disease to lymph nodes, who received radioiodine ablation of a thyroid remnant on 8/31/2005 using 97 mCi of iodine-131. COMPARISON: Thyrogen stimulated thyroglobulin level 1/24/2012    TECHNIQUE: The patient received Thyrogen injections IM on day  1 and 2 followed by measurement of thyroglobulin and antithyroglobulin antibody levels 3 days after. FINDINGS: A stimulated thyroglobulin level was measured at 0.4 (compared with 0.3 on the prior 1/23/2012 measurement) with no evidence of antithyroglobulin antibodies.        Small heterogeneous area of decreased echogenicity within the left thyroid lobe resection bed that is nonspecific and may reflect residual tissue or postsurgical changes, not substantially changed from 1/2016. Electronically Signed by: Gonzalo Waller MD, 10/27/2017 5:17 PM   Result Narrative   EXAMINATION: US-THYROID / NECK     DATE OF EXAM:  10/27/2017 4:40 PM    DEMOGRAPHICS: 65years old Female     INDICATION:     C73: Malignant neoplasm of thyroid gland [facility])     Reason for Exam:  Malignant neoplasm of thyroid gland ([address] COMPARISON: Thyroid ultrasound 1/4/2016    TECHNIQUE: Sonographic evaluation of the thyroid was performed. FINDINGS:         Right Lobe: The right thyroid lobe is surgically absent. Surgical bed appears normal.           Left Lobe: The left thyroid lobe is surgically absent with a small heterogeneous hypoechoic area in the surgical bed that measures roughly 7 x 3 x 4 mm, not substantially changed from 4 x 6 x 4 mm on the prior study from 1/4/2016. Isthmus:     Not visualized            Additional findings:   2 normal-appearing cervical lymph nodes are demonstrated. PATIENT WILL HAND CARRY ORDER   BD-BONE DENSITY DEXA AXIAL SKELETON, 7/26/2021 9:42 AM       INDICATION:     M85.80: Other specified disorders of bone density and    structure, unspecified site            DEMOGRAPHICS: 62years old Female        COMPARISON: 7/23/2019       TECHNIQUE : The patient's lumbar spine and hips were scanned with    dual-energy x-ray absorptiometry (DXA) utilizing a [employer]    [id number] unit. Lumbar spine: Analysis of L1-L4 shows a bone mineral density of 0.946     g/cm2. This correlates with the T-score of -2.0. Left Hip: Analysis of the femoral neck shows a bone mineral density of    0.752  g/cm2. This correlates with a T-score of -2.1. Left Hip: Analysis of the total hip shows a bone mineral density of 0.720     g/cm2. This correlates with a T-score of -2.3. Right Hip: Analysis of the femoral neck shows a bone mineral density of    0.731  g/cm2. This correlates with a T-score of -2.2.         Right Hip: Analysis of the right total hip shows a bone mineral density of    0.733  g/cm2. This correlates with a T score of -2.2. The FRAX 10 year probability for a major osteoporotic fracture is 16.8 %    and the probability of fracture at the hip is 2.8 %. T score interpretation according to the [address] [place] [address]:       T-score at or above  -1.0  Normal       T-score  between  -1.0  and  -2.5  osteopenia       T-score at or below  -2.5   osteoporosis       [IMPRESSION]   1. Low bone mass (osteopenia). The bone mineral density is compatible    with low bone mass (osteopenia) and may represent increased risk of    fracture. 2.  The FRAX 10 year probability for a major osteoporotic fracture as    above. 3.  Without LSC reported, quantitative comparative analysis not possible    at this time, however osteopenia present on last examination            Past Medical History:   Diagnosis Date    Cancer of thyroid ([facility] 2007    Fracture 2018     Patient Active Problem List    Diagnosis Date Noted    Th[facility] cancer ([facility] 2018    Postoperative hypothyroidism 2018    Osteopenia 2018    Vitamin D deficiency 2018    Menopause 2018     Past Surgical History:   Procedure Laterality Date    CERVICAL FUSION      TOTAL THYROIDECTOMY       History reviewed. No pertinent family history. Social History     Socioeconomic History    Marital status:      Spouse name: None    Number of children: None    Years of education: None    Highest education level: None   Tobacco Use    Smoking status: Former     Packs/day: 1.00     Years: 15.00     Pack years: 15.00     Types: Cigarettes     Quit date: 10/22/2000     Years since quittin.2    Smokeless tobacco: Never    Tobacco comments:     quit in    Vaping Use    Vaping Use: Never used   Substance and Sexual Activity    Alcohol use: Not Currently     Comment: ocassionally     Drug use:  No Sexual activity: Never     Current Outpatient Medications   Medication Sig Dispense Refill    vitamin D (D3-50) 84386 UNIT CAPS TAKE 1 CAPSULE EVERY 10 DAYS 9 capsule 1    SYNTHROID 125 MCG tablet Take 1 tablet by mouth every morning (before breakfast) 90 tablet 1    BYSTOLIC 5 MG tablet Generic Nebvilol  1     No current facility-administered medications for this visit.      No Known Allergies  Family Status   Relation Name Status    Mother  Alive    Father  Alive    Sister  Alive    Brother  Alive    Son  Alive    Son  Alive       Review of Systems:  Constitutional: has fatigue, no fever, no recent weight gain, no recent weight loss, no changes in appetite  Eyes: no eye pain, no change in vision, no eye redness, no eye irritation, no double vision  Ears, nose, throat: no nasal congestion, no sore throat, no earache, no decrease in hearing, no hoarseness, no dry mouth, no sinus problems, no difficulty swallowing, no neck lumps, no dental problems, no mouth sores, no ringing in ears  Pulmonary: no shortness of breath, no wheezing, no dyspnea on exertion, no cough  Cardiovascular: no chest pain, no lower extremity edema, no orthopnea, no intermittent leg claudication, no palpitations  Gastrointestinal: no abdominal pain, no nausea, no vomiting, no diarrhea, no constipation, no heartburn, no bloating  Genitourinary: no dysuria, no urinary incontinence, no urinary hesitancy, no change in urinary frequency, no feelings of urinary urgency, no nocturia  Musculoskeletal: no joint swelling, no joint stiffness, no joint pain, no muscle cramps, no muscle pain, no bone pain  Integument/Breast: no hair loss, no skin rashes, no skin lesions, no itching, no dry skin, no breast pain, no breast mass, no skin hives, no skin discoloration, no nipple discharge  Neurological: no numbness, no tingling, no weakness, no confusion, no headaches, no dizziness, no fainting, no tremors, no decrease in memory, no balance problems  Psychiatric: no anxiety, no depression, no insomnia  Hematologic/Lymphatic: no tendency for easy bleeding, no swollen lymph nodes, no tendency for easy bruising  Immunology: no seasonal allergies, no frequent infections, no frequent illnesses  Endocrine: no temperature intolerance, no hot flashes, no hand tremor    OBJECTIVE:   BP (!) 131/93   Pulse 71   Temp 98 °F (36.7 °C)   Resp 14   Ht 5' 8\" (1.727 m)   Wt 141 lb (64 kg)   SpO2 97%   BMI 21.44 kg/m²   Wt Readings from Last 3 Encounters:   01/17/23 141 lb (64 kg)   09/26/22 141 lb (64 kg)   03/28/22 146 lb (66.2 kg)       Physical Exam:  Constitutional: no acute distress, well appearing, well nourished  Psychiatric: oriented to person, place and time, judgement, insight and normal, recent and remote memory and intact and mood, affect are normal  Skin: skin and subcutaneous tissue is normal without mass, normal turgor  Head and Face: examination of head and face revealed no abnormalities  Eyes: no lid or conjunctival swelling, no erythema or discharge, pupils are normal, equal, round, and reactive to light  Ears/Nose: external inspection of ears and nose revealed no abnormalities, hearing is grossly normal  Oropharynx/Mouth/Face: lips, tongue and gums are normal with no lesions, the voice quality was normal  Neck: neck is supple and symmetric, with midline trachea and no masses, thyroid is absent  Lymphatics: normal cervical lymph nodes, normal supraclavicular nodes  Pulmonary: no increased work of breathing or signs of respiratory distress, lungs are clear to auscultation  Cardiovascular: normal heart rate and rhythm, normal S1 and S2, no murmurs and pedal pulses and 2+ bilaterally, No edema  Abdomen: abdomen is soft, non-tender with no masses  Musculoskeletal: normal gait and station, exam of the digits and nails are normal  Neurological: normal coordination, normal general cortical function    Lab Review:  Lab Results   Component Value Date/Time    TSH 0.940 01/09/2023 08:25 AM     No results found for: FREET4     ASSESSMENT/PLAN:    1. Thyroid cancer   3/9/2022 Dr. Veda Melvin consultation:  Absent thyroid  Stable left subcm paratracheal likely LN 0.3x0.3x0.6cm (prior 0.5cm)  Benign appearing cervical lymph nodes bilaterally  No Evidence of Disease  2/24/2021 Dr. Veda Melvin consultation:  Ill-defined left subcentimeters paratracheal possible remnant versus lymph node 0.3 x 0.3 x 0.5 cm  Benign-appearing cervical lymph nodes bilaterally  No evidence of disease    12/15/2020 thyroid ultrasound  1. In the left thyroid bed, there is a nodule measuring 8 x 8 x 8 mm    which is mildly increased from prior and may be residual thyroid tissue or    thyroid lesion. Electronically Signed by: Erica Gomez MD, 12/15/2020 3:29 PM     Thyrogen stimulated scans on 1/24/2012 and 11/20/2016   Thyroglobulin <0.1-0.1-<0.1-0.1-<0.1-0.1  Thyroglobulin antibody <1<1.0<1.0-<1.0-<1.0  - Anti-Thyroglobulin Antibody; Future  - Thyroglobulin; Future    2. Postoperative hypothyroidism  Uncontrolled  Decrease Synthroid to 0.125 mg qd  TSH 0.19-0.3-1.1-1.4-3.8-0.8-0.5-0.228-0.203-0.7-0.184-0.94  - T4, Free; Future  - TSH without Reflex; Future    3. Osteopenia, unspecified location  Calcium 9.9-9.8  DEXA 7/2021  Direct comparison is not possible because of the different devices  T-scores similar  Rib fracture. No smoking  No RA  No hip fracture in family  No long term steroid use  On alendronate before, had jaw pain and bone/joint pain. Took for 1 month. Does exercise program, does not want treatment, understand risks    4. Vitamin D deficiency  Calcium 9.9-9.8-9.7  Vitamin D 50,000 IU every 10 days  25 hydroxy vitamin D 04-37-93-69-87-31-59-70-62  Vitamin D 50,000 IU weekly  - Vitamin D 25 Hydroxy; Future    5. Menopause  Follow.     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  8/31/2005 I-131 Tx 97mCi. Obtained history from other than patient No    Vidya Serrano was counseled regarding symptoms of thyroid, osteopenia, thyroid cancer diagnosis, course and complications of disease if inadequately treated, side effects of medications, diagnosis, treatment options, and prognosis, risks, benefits, complications, and alternatives of treatment, labs, imaging and other studies and treatment targets and goals, risk of thyroid cancer recurrence, evaluation. She understands instructions and counseling. Return in about 4 months (around 5/17/2023) for thyroid problems.

## 2023-01-20 ENCOUNTER — TELEPHONE (OUTPATIENT)
Dept: ENDOCRINOLOGY | Age: 63
End: 2023-01-20

## 2023-01-20 LAB — ANTI-THYROGLOB ABS: 0.19

## 2023-01-20 NOTE — TELEPHONE ENCOUNTER
Please review attached document, pt needs a PA for Synthroid.  Pt has previously had a PA in [phone]

## 2023-01-20 NOTE — TELEPHONE ENCOUNTER
[employer] Key: [id number]  Outcome  Drug  Synthroid 125MCG tablets  Available without authorization.

## 2023-01-22 ENCOUNTER — TELEPHONE (OUTPATIENT)
Dept: ENDOCRINOLOGY | Age: 63
End: 2023-01-22

## 2023-01-22 LAB
THYROGLOBULIN ANTIBODY: <1 IU/ML
THYROGLOBULIN: 0.1 NG/ML (ref 2.8–40.9)

## 2023-01-30 DIAGNOSIS — E55.9 VITAMIN D DEFICIENCY: ICD-10-CM

## 2023-01-30 DIAGNOSIS — C73 THYROID CANCER (HCC): ICD-10-CM

## 2023-01-30 DIAGNOSIS — E89.0 POSTOPERATIVE HYPOTHYROIDISM: ICD-10-CM

## 2023-01-30 RX ORDER — LEVOTHYROXINE SODIUM 137 MCG
TABLET ORAL
Qty: 90 TABLET | Refills: 0 | OUTPATIENT
Start: 2023-01-30

## 2023-01-30 RX ORDER — METHOCARBAMOL 750 MG/1
TABLET ORAL
Qty: 12 CAPSULE | Refills: 1 | OUTPATIENT
Start: 2023-01-30

## 2023-01-30 NOTE — TELEPHONE ENCOUNTER
NOV- 05/2023      Requested Prescriptions     Pending Prescriptions Disp Refills    SYNTHROID 137 MCG tablet [Pharmacy Med Name: SYNTHROID 137 MCG TABLET] 90 tablet 0     Sig: TAKE 1 TABLET BY MOUTH EVERY DAY BEFORE BREAKFAST    vitamin D (D3-50) 74535 UNIT CAPS [Pharmacy Med Name: VITAMIN D3-50 50,000 UNIT CAP] 12 capsule 1     Sig: TAKE 1 CAPSULE BY MOUTH ONE TIME PER WEEK

## 2023-01-31 DIAGNOSIS — E55.9 VITAMIN D DEFICIENCY: ICD-10-CM

## 2023-01-31 RX ORDER — METHOCARBAMOL 750 MG/1
TABLET ORAL
Qty: 9 CAPSULE | Refills: 1 | Status: SHIPPED | OUTPATIENT
Start: 2023-01-31

## 2023-01-31 NOTE — TELEPHONE ENCOUNTER
Both prescriptions were requested with incorrect doses. Please ask patient if she needs refills with correct doses. I sent prescriptions during her appointment, unclear why pharmacy sent refill requests.

## 2023-01-31 NOTE — TELEPHONE ENCOUNTER
NOV- 05/18/23      Requested Prescriptions     Pending Prescriptions Disp Refills    vitamin D (D3-50) 55214 UNIT CAPS 9 capsule 1     Sig: TAKE 1 CAPSULE EVERY 10 DAYS

## 2023-03-12 DIAGNOSIS — C73 THYROID CANCER (HCC): ICD-10-CM

## 2023-03-12 DIAGNOSIS — E89.0 POSTOPERATIVE HYPOTHYROIDISM: ICD-10-CM

## 2023-03-13 RX ORDER — LEVOTHYROXINE SODIUM 125 MCG
125 TABLET ORAL
Qty: 90 TABLET | Refills: 0 | Status: SHIPPED | OUTPATIENT
Start: 2023-03-13

## 2023-03-13 RX ORDER — LEVOTHYROXINE SODIUM 137 MCG
TABLET ORAL
Qty: 90 TABLET | Refills: 0 | OUTPATIENT
Start: 2023-03-13

## 2023-05-05 LAB
ALBUMIN/GLOBULIN RATIO: 2.1 RATIO (ref 0.8–2.6)
ALBUMIN: 4.6 G/DL (ref 3.5–5.2)
ALP BLD-CCNC: 111 U/L (ref 23–144)
ALT SERPL-CCNC: 17 U/L (ref 0–60)
AST SERPL-CCNC: 23 U/L (ref 0–55)
BILIRUB SERPL-MCNC: 0.5 MG/DL (ref 0–1.2)
BUN BLDV-MCNC: 14 MG/DL (ref 3–29)
BUN/CREAT BLD: 20 (ref 7–25)
CALCIUM SERPL-MCNC: 10 MG/DL (ref 8.5–10.5)
CHLORIDE BLD-SCNC: 104 MEQ/L (ref 96–110)
CO2: 28 MEQ/L (ref 19–32)
CREAT SERPL-MCNC: 0.7 MG/DL (ref 0.5–1.2)
GLOBULIN: 2.2 G/DL (ref 1.9–3.6)
GLOMERULAR FILTRATION RATE: 98 MLS/MIN/1.73M2
GLUCOSE BLD-MCNC: 87 MG/DL (ref 70–99)
POTASSIUM SERPL-SCNC: 4.4 MEQ/L (ref 3.4–5.3)
SODIUM BLD-SCNC: 143 MEQ/L (ref 135–148)
STATUS: NORMAL
T3 FREE: 3 PG/ML (ref 2.3–4.2)
T4 FREE: 1.84 NG/DL (ref 0.8–1.8)
TOTAL PROTEIN: 6.8 G/DL (ref 6–8.3)
TSH SERPL DL<=0.05 MIU/L-ACNC: 0.83 MCIU/ML (ref 0.4–4.5)
VITAMIN D 25-HYDROXY: 63 NG/ML (ref 30–100)

## 2023-05-08 LAB — ANTI-THYROGLOB ABS: 0.19

## 2023-05-11 LAB
THYROGLOBULIN ANTIBODY: <1 IU/ML
THYROGLOBULIN: <0.1 NG/ML (ref 2.8–40.9)

## 2023-05-18 ENCOUNTER — OFFICE VISIT (OUTPATIENT)
Dept: ENDOCRINOLOGY | Age: 63
End: 2023-05-18
Payer: COMMERCIAL

## 2023-05-18 VITALS
DIASTOLIC BLOOD PRESSURE: 87 MMHG | TEMPERATURE: 98 F | HEIGHT: 68 IN | SYSTOLIC BLOOD PRESSURE: 134 MMHG | HEART RATE: 63 BPM | BODY MASS INDEX: 22.13 KG/M2 | RESPIRATION RATE: 14 BRPM | WEIGHT: 146 LBS | OXYGEN SATURATION: 98 %

## 2023-05-18 DIAGNOSIS — M85.80 OSTEOPENIA, UNSPECIFIED LOCATION: ICD-10-CM

## 2023-05-18 DIAGNOSIS — E55.9 VITAMIN D DEFICIENCY: ICD-10-CM

## 2023-05-18 DIAGNOSIS — C73 THYROID CANCER (HCC): Primary | ICD-10-CM

## 2023-05-18 DIAGNOSIS — Z78.0 MENOPAUSE: ICD-10-CM

## 2023-05-18 DIAGNOSIS — E89.0 POSTOPERATIVE HYPOTHYROIDISM: ICD-10-CM

## 2023-05-18 PROCEDURE — 99214 OFFICE O/P EST MOD 30 MIN: CPT | Performed by: INTERNAL MEDICINE

## 2023-05-18 RX ORDER — LEVOTHYROXINE SODIUM 125 MCG
125 TABLET ORAL
Qty: 90 TABLET | Refills: 1 | Status: SHIPPED | OUTPATIENT
Start: 2023-05-18

## 2023-05-18 NOTE — PROGRESS NOTES
TSH 0.834 05/05/2023 07:37 AM     No results found for: FREET4     ASSESSMENT/PLAN:    1. Thyroid cancer   Will see Dr. Aristides Miller in 2025  3/9/2022 Dr. Alisia Hu consultation:  Absent thyroid  Stable left subcm paratracheal likely LN 0.3x0.3x0.6cm (prior 0.5cm)  Benign appearing cervical lymph nodes bilaterally  No Evidence of Disease  2/24/2021 Dr. Alisia Hu consultation:  Ill-defined left subcentimeters paratracheal possible remnant versus lymph node 0.3 x 0.3 x 0.5 cm  Benign-appearing cervical lymph nodes bilaterally  No evidence of disease    12/15/2020 thyroid ultrasound  1. In the left thyroid bed, there is a nodule measuring 8 x 8 x 8 mm    which is mildly increased from prior and may be residual thyroid tissue or    thyroid lesion. Electronically Signed by: Sofiya Huizar MD, 12/15/2020 3:29 PM     Thyrogen stimulated scans on 1/24/2012 and 11/20/2016   Thyroglobulin <0.1-0.1-<0.1-0.1-<0.1-0.1-<0.1  Thyroglobulin antibody <1<1.0<1.0-<1.0-<1.0  - Anti-Thyroglobulin Antibody; Future  - Thyroglobulin; Future    2. Postoperative hypothyroidism  Synthroid 0.125 mg daily  TSH 0.19-0.3-1.1-1.4-3.8-0.8-0.5-0.228-0.203-0.7-0.184-0.94-0.834  - T4, Free; Future  - TSH without Reflex; Future    3. Osteopenia, unspecified location  Calcium 9.9-9.8  DEXA 7/2021  Direct comparison is not possible because of the different devices  T-scores similar  Rib fracture. No smoking  No RA  No hip fracture in family  No long term steroid use  On alendronate before, had jaw pain and bone/joint pain. Took for 1 month. Does exercise program, does not want treatment, understand risks    4. Vitamin D deficiency  Calcium 9.9-9.8-9.7  Vitamin D 50,000 IU every 14 days  Calcium 10.0  25 hydroxy vitamin D 67-93-02-09-74-61-59-70-62-63  - Vitamin D 25 Hydroxy; Future    5. Menopause  Follow.     Reviewed and/or ordered clinical lab results Yes  Reviewed and/or ordered radiology tests Yes   Reviewed and/or ordered other

## 2023-06-13 DIAGNOSIS — C73 THYROID CANCER (HCC): ICD-10-CM

## 2023-06-13 RX ORDER — LEVOTHYROXINE SODIUM 125 MCG
TABLET ORAL
Qty: 90 TABLET | Refills: 0 | Status: SHIPPED | OUTPATIENT
Start: 2023-06-13 | End: 2023-08-02

## 2023-10-24 LAB
ALBUMIN/GLOBULIN RATIO: 2.3 RATIO (ref 0.8–2.6)
ALBUMIN: 4.5 G/DL (ref 3.5–5.2)
ALP BLD-CCNC: 103 U/L (ref 23–144)
ALT SERPL-CCNC: 15 U/L (ref 0–60)
AST SERPL-CCNC: 21 U/L (ref 0–55)
BILIRUB SERPL-MCNC: 0.6 MG/DL (ref 0–1.2)
BUN BLDV-MCNC: 13 MG/DL (ref 3–29)
BUN/CREAT BLD: 19 (ref 7–25)
CALCIUM SERPL-MCNC: 9.6 MG/DL (ref 8.5–10.5)
CHLORIDE BLD-SCNC: 100 MEQ/L (ref 96–110)
CO2: 30 MEQ/L (ref 19–32)
CREAT SERPL-MCNC: 0.7 MG/DL (ref 0.5–1.2)
GLOBULIN: 2 G/DL (ref 1.9–3.6)
GLOMERULAR FILTRATION RATE: 98 MLS/MIN/1.73M2
GLUCOSE BLD-MCNC: 86 MG/DL (ref 70–99)
POTASSIUM SERPL-SCNC: 4.1 MEQ/L (ref 3.4–5.3)
SODIUM BLD-SCNC: 141 MEQ/L (ref 135–148)
STATUS: NORMAL
T3 FREE: 3.4 PG/ML (ref 2.3–4.2)
T4 FREE: 1.81 NG/DL (ref 0.8–1.8)
TOTAL PROTEIN: 6.5 G/DL (ref 6–8.3)
TSH SERPL DL<=0.05 MIU/L-ACNC: 0.42 MCIU/ML (ref 0.4–4.5)
VITAMIN D 25-HYDROXY: 61 NG/ML (ref 30–100)

## 2023-10-25 ENCOUNTER — PATIENT MESSAGE (OUTPATIENT)
Dept: ENDOCRINOLOGY | Age: 63
End: 2023-10-25

## 2023-10-25 LAB — ANTI-THYROGLOB ABS: 1.18

## 2023-10-31 LAB
THYROGLOBULIN ANTIBODY: <1 IU/ML
THYROGLOBULIN: 0.1 NG/ML (ref 2.8–40.9)

## 2023-11-02 NOTE — TELEPHONE ENCOUNTER
Bonnie Contreras did try to get an earlier appt. with Dr. Arnold Barillas and could not. If you would clarify with her, the tumor marker came back positive so I am concerned but understand she wants to go over the labs with me on 11/7. It would help me if she knows that in advance, along with the other bloodwork results, so we're ready at my appt. with a plan. My mobile is [phone] if it's possible to discuss before the appt. I do respect and understand how busy she is!   Barbara Jackson

## 2023-11-03 NOTE — TELEPHONE ENCOUNTER
Thyroglobulin 0.1. Thyroglobulin antibody less than 1. However, done by different method antithyroglobulin antibody was 1.18. Explained to patient that thyroglobulin is thyroid cancer marker. Thyroglobulin antibody reflect if thyroglobulin can be considered accurate, but it is not cancer marker itself. Unclear why antithyroglobulin antibodies positive, might be interfering antibodies. We will repeat in 1 month. We will discuss again with patient during appointment.

## 2023-11-07 ENCOUNTER — OFFICE VISIT (OUTPATIENT)
Dept: ENDOCRINOLOGY | Age: 63
End: 2023-11-07
Payer: COMMERCIAL

## 2023-11-07 VITALS
OXYGEN SATURATION: 99 % | SYSTOLIC BLOOD PRESSURE: 127 MMHG | RESPIRATION RATE: 14 BRPM | HEART RATE: 63 BPM | TEMPERATURE: 98 F | BODY MASS INDEX: 22.28 KG/M2 | HEIGHT: 68 IN | DIASTOLIC BLOOD PRESSURE: 82 MMHG | WEIGHT: 147 LBS

## 2023-11-07 DIAGNOSIS — M85.80 OSTEOPENIA, UNSPECIFIED LOCATION: ICD-10-CM

## 2023-11-07 DIAGNOSIS — E55.9 VITAMIN D DEFICIENCY: ICD-10-CM

## 2023-11-07 DIAGNOSIS — Z78.0 MENOPAUSE: ICD-10-CM

## 2023-11-07 DIAGNOSIS — C73 THYROID CANCER (HCC): Primary | ICD-10-CM

## 2023-11-07 DIAGNOSIS — E89.0 POSTOPERATIVE HYPOTHYROIDISM: ICD-10-CM

## 2023-11-07 PROCEDURE — 99214 OFFICE O/P EST MOD 30 MIN: CPT | Performed by: INTERNAL MEDICINE

## 2023-11-07 RX ORDER — METHOCARBAMOL 750 MG/1
TABLET ORAL
Qty: 9 CAPSULE | Refills: 1 | Status: SHIPPED | OUTPATIENT
Start: 2023-11-07

## 2023-11-07 RX ORDER — LEVOTHYROXINE SODIUM 125 MCG
125 TABLET ORAL
Qty: 90 TABLET | Refills: 1 | Status: SHIPPED | OUTPATIENT
Start: 2023-11-07

## 2023-11-07 NOTE — PROGRESS NOTES
and treatment targets and goals, risk of thyroid cancer recurrence, evaluation. She understands instructions and counseling. Return in about 6 months (around 5/7/2024) for thyroid problems.

## 2024-02-22 ENCOUNTER — TELEPHONE (OUTPATIENT)
Dept: ENDOCRINOLOGY | Age: 64
End: 2024-02-22

## 2024-02-22 NOTE — TELEPHONE ENCOUNTER
I reviewed the DEXA report.  No significant change since the previous study per radiology.  Low bone mass.  10-year probability of major osteoporotic fracture 11.2%, hip fracture 2.1%.  Left patient a message that bone density has not significantly changed and the risk of fractures is not high.

## 2024-04-20 DIAGNOSIS — C73 THYROID CANCER (HCC): ICD-10-CM

## 2024-04-22 RX ORDER — LEVOTHYROXINE SODIUM 125 MCG
125 TABLET ORAL
Qty: 90 TABLET | Refills: 1 | Status: SHIPPED | OUTPATIENT
Start: 2024-04-22

## 2024-05-01 LAB
A/G RATIO: 1.9 RATIO (ref 0.8–2.6)
ALBUMIN: 4.4 G/DL (ref 3.5–5.2)
ALP BLD-CCNC: 95 U/L (ref 23–144)
ALT SERPL-CCNC: 15 U/L (ref 0–60)
AST SERPL-CCNC: 24 U/L (ref 0–55)
BILIRUB SERPL-MCNC: 0.4 MG/DL (ref 0–1.2)
BUN / CREAT RATIO: 18 (ref 7–25)
BUN BLDV-MCNC: 11 MG/DL (ref 3–29)
CALCIUM SERPL-MCNC: 9.8 MG/DL (ref 8.5–10.5)
CHLORIDE BLD-SCNC: 106 MEQ/L (ref 96–110)
CO2: 27 MEQ/L (ref 19–32)
CREAT SERPL-MCNC: 0.6 MG/DL (ref 0.5–1.2)
ESTIMATED GLOMERULAR FILTRATION RATE CREATININE EQUATION: 101 MLS/MIN/1.73M2
FASTING STATUS: NORMAL
GLOBULIN: 2.3 G/DL (ref 1.9–3.6)
GLUCOSE BLD-MCNC: 93 MG/DL (ref 70–99)
POTASSIUM SERPL-SCNC: 4.3 MEQ/L (ref 3.4–5.3)
SODIUM BLD-SCNC: 142 MEQ/L (ref 135–148)
T3 FREE: 3.3 PG/ML (ref 2.3–4.2)
T4 FREE: 1.94 NG/DL (ref 0.8–1.8)
TOTAL PROTEIN: 6.7 G/DL (ref 6–8.3)
TSH ULTRASENSITIVE: 0.19 MCIU/ML (ref 0.4–4.5)
VITAMIN D 25-HYDROXY: 64 NG/ML (ref 30–100)

## 2024-05-03 LAB — THYROGLOBULIN AB: 0.25

## 2024-05-04 LAB
THYROGLOBULIN AB: <1 IU/ML
THYROGLOBULIN: <0.1 NG/ML (ref 2.8–40.9)

## 2024-05-16 ENCOUNTER — OFFICE VISIT (OUTPATIENT)
Dept: ENDOCRINOLOGY | Age: 64
End: 2024-05-16
Payer: COMMERCIAL

## 2024-05-16 VITALS
WEIGHT: 146 LBS | TEMPERATURE: 98 F | RESPIRATION RATE: 14 BRPM | HEART RATE: 64 BPM | BODY MASS INDEX: 22.13 KG/M2 | OXYGEN SATURATION: 97 % | HEIGHT: 68 IN | DIASTOLIC BLOOD PRESSURE: 91 MMHG | SYSTOLIC BLOOD PRESSURE: 145 MMHG

## 2024-05-16 DIAGNOSIS — C73 THYROID CANCER (HCC): Primary | ICD-10-CM

## 2024-05-16 DIAGNOSIS — Z78.0 MENOPAUSE: ICD-10-CM

## 2024-05-16 DIAGNOSIS — E89.0 POSTOPERATIVE HYPOTHYROIDISM: ICD-10-CM

## 2024-05-16 DIAGNOSIS — M85.80 OSTEOPENIA, UNSPECIFIED LOCATION: ICD-10-CM

## 2024-05-16 DIAGNOSIS — E55.9 VITAMIN D DEFICIENCY: ICD-10-CM

## 2024-05-16 PROCEDURE — 99214 OFFICE O/P EST MOD 30 MIN: CPT | Performed by: INTERNAL MEDICINE

## 2024-05-16 RX ORDER — LEVOTHYROXINE SODIUM 125 MCG
TABLET ORAL
Qty: 90 TABLET | Refills: 1 | Status: SHIPPED | OUTPATIENT
Start: 2024-05-16

## 2024-05-16 NOTE — PROGRESS NOTES
stimulated thyroglobulin level was measured at 0.4 (compared with 0.3 on the prior 1/23/2012 measurement) with no evidence of antithyroglobulin antibodies.    Will see Dr. Hanna in 2025  3/22/2023 Dr. Yovanny Hanna consultation:  Papillary thyroid carcinoma, status post total thyroidectomy with central node dissection and postoperative radioiodine with question of persistent left paratracheal subcentimeter remnant vs. lymph node. Ultrasound today revealed a stable isoechoic, left paratracheal remnant vs. Scar vs lymph node 0.6 cm in greatest dimension (longitudinal). After discussion of risks and benefits including alternative treatment options such as repeat Thyrogen stimulated thyroglobulin, repeat CHAVES or revision surgery, the patient would like to proceed with continued sonographic (1-2yrs) and annual thyroglobulin surveillance.   Absent thyroid  Stable left subcm paratracheal isoechoic poss remnant vs. LN 0.3x0.3x0.6cm (prior 0.5-0.6cm)  Benign appearing cervical lymph nodes bilaterally  No Evidence of Disease    3/9/2022 Dr. Yovanny Hanna consultation:  Absent thyroid  Stable left subcm paratracheal likely LN 0.3x0.3x0.6cm (prior 0.5cm)  Benign appearing cervical lymph nodes bilaterally  No Evidence of Disease  2/24/2021 Dr. Yovanny Hanna consultation:  Ill-defined left subcentimeters paratracheal possible remnant versus lymph node 0.3 x 0.3 x 0.5 cm  Benign-appearing cervical lymph nodes bilaterally  No evidence of disease    12/15/2020 thyroid ultrasound  1.  In the left thyroid bed, there is a nodule measuring 8 x 8 x 8 mm    which is mildly increased from prior and may be residual thyroid tissue or    thyroid lesion.       Electronically Signed by: Bryan Javier MD, 12/15/2020 3:29 PM     Thyrogen stimulated scans on 1/24/2012 and 11/20/2016   Thyroglobulin <0.1-0.1-<0.1-0.1-<0.1-0.1-<0.1-0.1-<0.1  Thyroglobulin antibody <1<1.0<1.0-<1.0-<1.0-<1.0-<1.0  Lab duplicated antithyroglobulin antibody test, done 
no

## 2024-05-24 ENCOUNTER — TELEPHONE (OUTPATIENT)
Dept: ENDOCRINOLOGY | Age: 64
End: 2024-05-24

## 2024-05-24 NOTE — TELEPHONE ENCOUNTER
Submitted PA for Synthroid  [employer] Key: [id number]    STATUS: Available without authorization.     If this requires a response please respond to the pool ( P MHC[facility] MEDICATION PRE-AUTH).      Thank you please advise patient.

## 2024-07-12 DIAGNOSIS — C73 THYROID CANCER (HCC): ICD-10-CM

## 2024-07-15 RX ORDER — LEVOTHYROXINE SODIUM 125 MCG
TABLET ORAL
Qty: 90 TABLET | Refills: 1 | Status: SHIPPED | OUTPATIENT
Start: 2024-07-15

## 2024-08-05 LAB
A/G RATIO: 2.2 RATIO (ref 0.8–2.6)
ALBUMIN: 4.6 G/DL (ref 3.5–5.2)
ALP BLD-CCNC: 100 U/L (ref 23–144)
ALT SERPL-CCNC: 15 U/L (ref 0–60)
AST SERPL-CCNC: 20 U/L (ref 0–55)
BILIRUB SERPL-MCNC: 0.5 MG/DL (ref 0–1.2)
BUN / CREAT RATIO: 17 (ref 7–25)
BUN BLDV-MCNC: 12 MG/DL (ref 3–29)
CALCIUM SERPL-MCNC: 10 MG/DL (ref 8.5–10.5)
CHLORIDE BLD-SCNC: 104 MEQ/L (ref 96–110)
CO2: 28 MEQ/L (ref 19–32)
CREAT SERPL-MCNC: 0.7 MG/DL (ref 0.5–1.2)
ESTIMATED GLOMERULAR FILTRATION RATE CREATININE EQUATION: 98 MLS/MIN/1.73M2
FASTING STATUS: NORMAL
GLOBULIN: 2.1 G/DL (ref 1.9–3.6)
GLUCOSE BLD-MCNC: 96 MG/DL (ref 70–99)
POTASSIUM SERPL-SCNC: 4.3 MEQ/L (ref 3.4–5.3)
SODIUM BLD-SCNC: 141 MEQ/L (ref 135–148)
T4 FREE: 1.72 NG/DL (ref 0.8–1.8)
TOTAL PROTEIN: 6.7 G/DL (ref 6–8.3)
TSH ULTRASENSITIVE: 1.28 MCIU/ML (ref 0.4–4.5)
VITAMIN D 25-HYDROXY: 57 NG/ML (ref 30–100)

## 2024-08-06 ENCOUNTER — TELEPHONE (OUTPATIENT)
Dept: ENDOCRINOLOGY | Age: 64
End: 2024-08-06

## 2024-08-15 ENCOUNTER — OFFICE VISIT (OUTPATIENT)
Dept: ENDOCRINOLOGY | Age: 64
End: 2024-08-15
Payer: COMMERCIAL

## 2024-08-15 VITALS
SYSTOLIC BLOOD PRESSURE: 135 MMHG | HEIGHT: 68 IN | BODY MASS INDEX: 22.13 KG/M2 | OXYGEN SATURATION: 98 % | TEMPERATURE: 98 F | HEART RATE: 64 BPM | DIASTOLIC BLOOD PRESSURE: 89 MMHG | RESPIRATION RATE: 14 BRPM | WEIGHT: 146 LBS

## 2024-08-15 DIAGNOSIS — C73 THYROID CANCER (HCC): Primary | ICD-10-CM

## 2024-08-15 DIAGNOSIS — E89.0 POSTOPERATIVE HYPOTHYROIDISM: ICD-10-CM

## 2024-08-15 DIAGNOSIS — E55.9 VITAMIN D DEFICIENCY: ICD-10-CM

## 2024-08-15 DIAGNOSIS — Z78.0 MENOPAUSE: ICD-10-CM

## 2024-08-15 DIAGNOSIS — M85.80 OSTEOPENIA, UNSPECIFIED LOCATION: ICD-10-CM

## 2024-08-15 PROCEDURE — 99214 OFFICE O/P EST MOD 30 MIN: CPT | Performed by: INTERNAL MEDICINE

## 2024-08-15 NOTE — PROGRESS NOTES
BMD (g/cm2): 0.736 ( g/cm2) , T-score: -2.2 , Z-score: -1.2   FRAX      10 year probability of major osteoporotic fracture is estimated at 11.2 %.   10 year probability of hip fracture is estimated at 2.1 %.   IMPRESSION:IMPRESSION:   1. Low bone mass (osteopenia).   2.  No significant change since the previous study.     Rib fracture.  No smoking  No RA  No hip fracture in family  No long term steroid use  On alendronate before, had jaw pain and bone/joint pain. Took for 1 month.  Does exercise program, does not want treatment, understand risks    4. Vitamin D deficiency  Calcium 9.9-9.8-9.7-9.6-9.8  Vitamin D 50,000 IU every 14 days  Calcium 10.0  25 hydroxy vitamin D 91-53-72-48-67-68-89-63-10-63-61-64-57  - Vitamin D 25 Hydroxy; Future    5. Menopause  Follow.    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  8/31/2005 I-131 Tx 97i.  Obtained history from other than patient No    Hair Paz was counseled regarding symptoms of thyroid, osteopenia, thyroid cancer diagnosis, course and complications of disease if inadequately treated, side effects of medications, diagnosis, treatment options, and prognosis, risks, benefits, complications, and alternatives of treatment, labs, imaging and other studies and treatment targets and goals, risk of thyroid cancer recurrence, evaluation.  She understands instructions and counseling.    Return in about 3 months (around 11/15/2024) for thyroid problems.

## 2024-10-25 DIAGNOSIS — E55.9 VITAMIN D DEFICIENCY: ICD-10-CM

## 2024-10-25 RX ORDER — CHOLECALCIFEROL (VITAMIN D3) 1250 MCG
CAPSULE ORAL
Qty: 9 CAPSULE | Refills: 1 | Status: SHIPPED | OUTPATIENT
Start: 2024-10-25

## 2024-12-02 LAB
A/G RATIO: 2 RATIO (ref 0.8–2.6)
ALBUMIN: 4.5 G/DL (ref 3.5–5.2)
ALP BLD-CCNC: 98 U/L (ref 23–144)
ALT SERPL-CCNC: 13 U/L (ref 0–60)
AST SERPL-CCNC: 21 U/L (ref 0–55)
BILIRUB SERPL-MCNC: 0.3 MG/DL (ref 0–1.2)
BUN / CREAT RATIO: 20 (ref 7–25)
BUN BLDV-MCNC: 14 MG/DL (ref 3–29)
CALCIUM SERPL-MCNC: 9.7 MG/DL (ref 8.5–10.5)
CHLORIDE BLD-SCNC: 101 MEQ/L (ref 96–110)
CO2: 27 MEQ/L (ref 19–32)
CREAT SERPL-MCNC: 0.7 MG/DL (ref 0.5–1.2)
ESTIMATED GLOMERULAR FILTRATION RATE CREATININE EQUATION: 97 MLS/MIN/1.73M2
FASTING STATUS: NORMAL
GLOBULIN: 2.3 G/DL (ref 1.9–3.6)
GLUCOSE BLD-MCNC: 92 MG/DL (ref 70–99)
POTASSIUM SERPL-SCNC: 4.2 MEQ/L (ref 3.4–5.3)
SODIUM BLD-SCNC: 140 MEQ/L (ref 135–148)
T4 FREE: 1.61 NG/DL (ref 0.8–1.8)
TOTAL PROTEIN: 6.8 G/DL (ref 6–8.3)
TSH ULTRASENSITIVE: 1.34 MCIU/ML (ref 0.4–4.5)
VITAMIN D 25-HYDROXY: 65 NG/ML (ref 30–100)

## 2024-12-04 LAB — THYROGLOBULIN AB: 0.63

## 2024-12-06 LAB
THYROGLOBULIN AB: <1 IU/ML
THYROGLOBULIN: 0.1 NG/ML (ref 2.8–40.9)

## 2024-12-09 ENCOUNTER — TELEPHONE (OUTPATIENT)
Dept: ENDOCRINOLOGY | Age: 64
End: 2024-12-09

## 2024-12-10 ENCOUNTER — OFFICE VISIT (OUTPATIENT)
Dept: ENDOCRINOLOGY | Age: 64
End: 2024-12-10
Payer: COMMERCIAL

## 2024-12-10 VITALS
OXYGEN SATURATION: 96 % | HEART RATE: 64 BPM | SYSTOLIC BLOOD PRESSURE: 148 MMHG | WEIGHT: 136 LBS | BODY MASS INDEX: 20.61 KG/M2 | DIASTOLIC BLOOD PRESSURE: 91 MMHG | RESPIRATION RATE: 14 BRPM | HEIGHT: 68 IN | TEMPERATURE: 98 F

## 2024-12-10 DIAGNOSIS — E55.9 VITAMIN D DEFICIENCY: ICD-10-CM

## 2024-12-10 DIAGNOSIS — C73 THYROID CANCER (HCC): ICD-10-CM

## 2024-12-10 PROCEDURE — 99214 OFFICE O/P EST MOD 30 MIN: CPT | Performed by: INTERNAL MEDICINE

## 2024-12-10 RX ORDER — CHOLECALCIFEROL (VITAMIN D3) 1250 MCG
CAPSULE ORAL
Qty: 9 CAPSULE | Refills: 1 | Status: SHIPPED | OUTPATIENT
Start: 2024-12-10

## 2024-12-10 RX ORDER — LEVOTHYROXINE SODIUM 125 MCG
125 TABLET ORAL DAILY
Qty: 90 TABLET | Refills: 1 | Status: SHIPPED | OUTPATIENT
Start: 2024-12-10

## 2024-12-10 NOTE — PROGRESS NOTES
SUBJECTIVE:  Hair Paz is a 64 y.o. female who is here for hypothyroidism.     1. 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  8/31/2005 I-131 Tx 97mCi.  History of obstructive symptoms: difficulty swallowing No, changes in voice/hoarseness No.  History of radiation to patient's neck: No  Resent iodine exposure: No  Family history includes no thyroid abnormalities.  Family history of thyroid cancer: No    2. Postoperative hypothyroidism    This started in 2005. Patient was diagnosed with thyroid cancer. The problem has been unchanged.  Patient started medication in 2005. Currently patient is on: Synthroid. Misses  0 doses a month.    Current complaints: has fatigue, anxiety.  Has sweating at night.  Had palpitations, resolved.  Gets more sleep.    3. Osteopenia, unspecified location  Had rib fractures.  Calcium in food, takes vitamin D.    4. Vitamin D deficiency  No bone pain.    5. Menopause  No hot flashes.    DATE OF EXAM:  2/22/2024 8:15 AM   DEMOGRAPHICS: 63 years old Female   INDICATION:     M85.80: Other specified disorders of bone density and   structure, unspecified site     History:  Osteopenia, unspecified   location. Number of Series/Images:  3.   COMPARISON: 7/26/2021   TECHNIQUE : Bone mineral density analysis was performed of the regions   described below utilizing the DXA method.   Model: [employer] [id number] Location: [facility]   T score interpretation according to the National Osteoporosis Foundation:   T-score at or above  -1.0  Normal   T-score  between  -1.0  and  -2.5  osteopenia   T-score at or below  -2.5   osteoporosis   REGION:   L-spine (L1-4):   BMD (g/cm2): 0.919  g/cm2 , T-score: -2.2 , Z-score: -0.9   Left hip (Neck):   BMD (g/cm2): 0.708  g/cm2 , T-score: -2.4 , Z-score: -1.1   Left hip (Total):   BMD (g/cm2): 0.726  g/cm2 , T-score: -2.2 , Z-score: -1.2   Right hip (Neck):   BMD (g/cm2): 0.713 ( g/cm2)

## 2025-02-28 LAB
A/G RATIO: 1.8 RATIO (ref 0.8–2.6)
ALBUMIN: 4.6 G/DL (ref 3.5–5.2)
ALP BLD-CCNC: 104 U/L (ref 23–144)
ALT SERPL-CCNC: 17 U/L (ref 0–60)
AST SERPL-CCNC: 24 U/L (ref 0–55)
BILIRUB SERPL-MCNC: 0.5 MG/DL (ref 0–1.2)
BUN / CREAT RATIO: 16 (ref 7–25)
BUN BLDV-MCNC: 11 MG/DL (ref 3–29)
CALCIUM SERPL-MCNC: 10 MG/DL (ref 8.5–10.5)
CHLORIDE BLD-SCNC: 101 MEQ/L (ref 96–110)
CO2: 28 MEQ/L (ref 19–32)
CREAT SERPL-MCNC: 0.7 MG/DL (ref 0.5–1.2)
ESTIMATED GLOMERULAR FILTRATION RATE CREATININE EQUATION: 97 MLS/MIN/1.73M2
FASTING STATUS: NORMAL
GLOBULIN: 2.5 G/DL (ref 1.9–3.6)
GLUCOSE BLD-MCNC: 90 MG/DL (ref 70–99)
POTASSIUM SERPL-SCNC: 4.1 MEQ/L (ref 3.4–5.3)
SODIUM BLD-SCNC: 140 MEQ/L (ref 135–148)
T4 FREE: 1.78 NG/DL (ref 0.8–1.8)
TOTAL PROTEIN: 7.1 G/DL (ref 6–8.3)
TSH ULTRASENSITIVE: 1.52 MCIU/ML (ref 0.4–4.5)
VITAMIN D 25-HYDROXY: 70 NG/ML (ref 30–100)

## 2025-03-03 LAB — THYROGLOBULIN AB: 0.44

## 2025-03-04 ENCOUNTER — TELEPHONE (OUTPATIENT)
Dept: ENDOCRINOLOGY | Age: 65
End: 2025-03-04

## 2025-03-04 LAB
THYROGLOBULIN AB: <1 IU/ML
THYROGLOBULIN: 0.1 NG/ML (ref 2.8–40.9)

## 2025-03-14 ENCOUNTER — TELEPHONE (OUTPATIENT)
Dept: ENDOCRINOLOGY | Age: 65
End: 2025-03-14

## 2025-03-14 ENCOUNTER — OFFICE VISIT (OUTPATIENT)
Dept: ENDOCRINOLOGY | Age: 65
End: 2025-03-14
Payer: COMMERCIAL

## 2025-03-14 VITALS
SYSTOLIC BLOOD PRESSURE: 129 MMHG | OXYGEN SATURATION: 96 % | BODY MASS INDEX: 20.76 KG/M2 | HEART RATE: 62 BPM | RESPIRATION RATE: 14 BRPM | DIASTOLIC BLOOD PRESSURE: 72 MMHG | TEMPERATURE: 98 F | HEIGHT: 68 IN | WEIGHT: 137 LBS

## 2025-03-14 DIAGNOSIS — E89.0 POSTOPERATIVE HYPOTHYROIDISM: ICD-10-CM

## 2025-03-14 DIAGNOSIS — C73 THYROID CANCER (HCC): Primary | ICD-10-CM

## 2025-03-14 DIAGNOSIS — E55.9 VITAMIN D DEFICIENCY: ICD-10-CM

## 2025-03-14 DIAGNOSIS — Z78.0 MENOPAUSE: ICD-10-CM

## 2025-03-14 DIAGNOSIS — M85.80 OSTEOPENIA, UNSPECIFIED LOCATION: ICD-10-CM

## 2025-03-14 PROCEDURE — 3017F COLORECTAL CA SCREEN DOC REV: CPT | Performed by: INTERNAL MEDICINE

## 2025-03-14 PROCEDURE — 99214 OFFICE O/P EST MOD 30 MIN: CPT | Performed by: INTERNAL MEDICINE

## 2025-03-14 PROCEDURE — 1036F TOBACCO NON-USER: CPT | Performed by: INTERNAL MEDICINE

## 2025-03-14 PROCEDURE — G8427 DOCREV CUR MEDS BY ELIG CLIN: HCPCS | Performed by: INTERNAL MEDICINE

## 2025-03-14 PROCEDURE — G8420 CALC BMI NORM PARAMETERS: HCPCS | Performed by: INTERNAL MEDICINE

## 2025-03-14 RX ORDER — CHOLECALCIFEROL (VITAMIN D3) 1250 MCG
1 CAPSULE ORAL
Qty: 7 CAPSULE | Refills: 1
Start: 2025-03-14

## 2025-03-14 RX ORDER — CHOLECALCIFEROL (VITAMIN D3) 1250 MCG
1 CAPSULE ORAL
Qty: 7 CAPSULE | Refills: 1
Start: 2025-03-14 | End: 2025-03-14 | Stop reason: SDUPTHER

## 2025-03-14 RX ORDER — LEVOTHYROXINE SODIUM 125 MCG
125 TABLET ORAL DAILY
Qty: 90 TABLET | Refills: 1
Start: 2025-03-14

## 2025-03-14 NOTE — PROGRESS NOTES
SUBJECTIVE:  Hair Paz is a 64 y.o. female who is here for hypothyroidism.     1. 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  8/31/2005 I-131 Tx 97mCi.  History of obstructive symptoms: difficulty swallowing No, changes in voice/hoarseness No.  History of radiation to patient's neck: No  Resent iodine exposure: No  Family history includes no thyroid abnormalities.  Family history of thyroid cancer: No    2. Postoperative hypothyroidism    This started in 2005. Patient was diagnosed with thyroid cancer. The problem has been unchanged.  Patient started medication in 2005. Currently patient is on: Synthroid. Misses  0 doses a month.    Current complaints: has fatigue, anxiety.  Has sweating at night.  Had palpitations, resolved.  Gets more sleep.    3. Osteopenia, unspecified location  Had rib fractures.  Calcium in food, takes vitamin D.    4. Vitamin D deficiency  No bone pain.    5. Menopause  No hot flashes.    DATE OF EXAM:  2/22/2024 8:15 AM   DEMOGRAPHICS: 63 years old Female   INDICATION:     M85.80: Other specified disorders of bone density and   structure, unspecified site     History:  Osteopenia, unspecified   location. Number of Series/Images:  3.   COMPARISON: 7/26/2021   TECHNIQUE : Bone mineral density analysis was performed of the regions   described below utilizing the DXA method.   Model: [employer] [id number] Location: [facility]   T score interpretation according to the National Osteoporosis Foundation:   T-score at or above  -1.0  Normal   T-score  between  -1.0  and  -2.5  osteopenia   T-score at or below  -2.5   osteoporosis   REGION:   L-spine (L1-4):   BMD (g/cm2): 0.919  g/cm2 , T-score: -2.2 , Z-score: -0.9   Left hip (Neck):   BMD (g/cm2): 0.708  g/cm2 , T-score: -2.4 , Z-score: -1.1   Left hip (Total):   BMD (g/cm2): 0.726  g/cm2 , T-score: -2.2 , Z-score: -1.2   Right hip (Neck):   BMD (g/cm2): 0.713 ( g/cm2)

## 2025-03-16 ENCOUNTER — PATIENT MESSAGE (OUTPATIENT)
Dept: ENDOCRINOLOGY | Age: 65
End: 2025-03-16

## 2025-03-17 NOTE — TELEPHONE ENCOUNTER
Spoke with patient, notified that these are correct instructions.  Per Dr. Hanna note, TSH low normal range.  Increased Synthroid dose slightly by half tablet weekly.  Let me know if any problems.

## 2025-03-17 NOTE — TELEPHONE ENCOUNTER
Submitted SHANITA SCHERER for Synthroid Via [employer] Key: [id number] STATUS: PENDING.    Follow up done daily; if no decision with in three days we will refax.  If another three days goes by with no decision will call the insurance for status.

## 2025-03-18 NOTE — TELEPHONE ENCOUNTER
Please submit appeal:  Patient is only able to tolerate brand Synthroid from [facility] pharmacy.  She tried different Synthroid supplier before and had severe rash and other side effects.  Therefore I specifically request to approve Synthroid which she can get from local pharmacy.  Patient has thyroid cancer and is extremely important and medically necessary to control her thyroid cancer to prevent recurrence.  We already failed trying a different supplier with allergic reaction, therefore trying generic would be dangerous for patient wellbeing.

## 2025-03-18 NOTE — TELEPHONE ENCOUNTER
The medication was DENIED; DENIAL letter is uploaded to MEDIA. Generic Denial:  Other; please see Denial Letter.     If you want an APPEAL; please note in this encounter what new information you would like to APPEAL with.  Once complete route back to [place] POOL (P [facility] MEDICATION PRE-AUTH).      Thank you please advise patient.

## 2025-03-20 NOTE — TELEPHONE ENCOUNTER
An Appeal has been faxed in for Synthroid.  Appeals normally take 30 days to come back with a decision, but follow up will be done every 10 days.    If no response by the 30th day, then the insurance will be called to check status.    If this requires a response please respond to the pool ( P [facility] MEDICATION PRE-AUTH).      Thank you please advise patient.

## 2025-04-25 DIAGNOSIS — C73 THYROID CANCER (HCC): ICD-10-CM

## 2025-04-28 RX ORDER — LEVOTHYROXINE SODIUM 125 MCG
125 TABLET ORAL DAILY
Qty: 90 TABLET | Refills: 0 | Status: SHIPPED | OUTPATIENT
Start: 2025-04-28

## 2025-07-07 LAB
A/G RATIO: 1.9 RATIO (ref 0.8–2.6)
ALBUMIN: 4.4 G/DL (ref 3.5–5.2)
ALP BLD-CCNC: 94 U/L (ref 23–144)
ALT SERPL-CCNC: 12 U/L (ref 0–60)
AST SERPL-CCNC: 18 U/L (ref 0–55)
BILIRUB SERPL-MCNC: 0.6 MG/DL (ref 0–1.2)
BUN / CREAT RATIO: 17 (ref 7–25)
BUN BLDV-MCNC: 12 MG/DL (ref 3–29)
CALCIUM SERPL-MCNC: 9.7 MG/DL (ref 8.5–10.5)
CHLORIDE BLD-SCNC: 103 MEQ/L (ref 96–110)
CO2: 27 MEQ/L (ref 19–32)
CREAT SERPL-MCNC: 0.7 MG/DL (ref 0.5–1.2)
ESTIMATED GLOMERULAR FILTRATION RATE CREATININE EQUATION: 97 MLS/MIN/1.73M2
FASTING STATUS: NORMAL
GLOBULIN: 2.3 G/DL (ref 1.9–3.6)
GLUCOSE BLD-MCNC: 91 MG/DL (ref 70–99)
POTASSIUM SERPL-SCNC: 4.2 MEQ/L (ref 3.4–5.3)
SODIUM BLD-SCNC: 139 MEQ/L (ref 135–148)
T4 FREE: 1.84 NG/DL (ref 0.8–1.8)
TOTAL PROTEIN: 6.7 G/DL (ref 6–8.3)
TSH ULTRASENSITIVE: 0.93 MCIU/ML (ref 0.4–4.5)
VITAMIN D 25-HYDROXY: 56 NG/ML (ref 30–100)

## 2025-07-08 LAB — THYROGLOBULIN AB: 0.18

## 2025-07-10 ENCOUNTER — TELEPHONE (OUTPATIENT)
Dept: ENDOCRINOLOGY | Age: 65
End: 2025-07-10

## 2025-07-10 LAB
THYROGLOBULIN AB: <1 IU/ML
THYROGLOBULIN: 0.1 NG/ML (ref 2.8–40.9)

## 2025-07-14 ENCOUNTER — OFFICE VISIT (OUTPATIENT)
Dept: ENDOCRINOLOGY | Age: 65
End: 2025-07-14
Payer: COMMERCIAL

## 2025-07-14 VITALS
SYSTOLIC BLOOD PRESSURE: 135 MMHG | WEIGHT: 139 LBS | HEIGHT: 68 IN | OXYGEN SATURATION: 98 % | BODY MASS INDEX: 21.07 KG/M2 | TEMPERATURE: 98 F | DIASTOLIC BLOOD PRESSURE: 88 MMHG | HEART RATE: 60 BPM | RESPIRATION RATE: 16 BRPM

## 2025-07-14 DIAGNOSIS — Z78.0 MENOPAUSE: ICD-10-CM

## 2025-07-14 DIAGNOSIS — E55.9 VITAMIN D DEFICIENCY: ICD-10-CM

## 2025-07-14 DIAGNOSIS — M85.80 OSTEOPENIA, UNSPECIFIED LOCATION: ICD-10-CM

## 2025-07-14 DIAGNOSIS — C73 THYROID CANCER (HCC): Primary | ICD-10-CM

## 2025-07-14 DIAGNOSIS — E89.0 POSTOPERATIVE HYPOTHYROIDISM: ICD-10-CM

## 2025-07-14 PROCEDURE — 99214 OFFICE O/P EST MOD 30 MIN: CPT | Performed by: INTERNAL MEDICINE

## 2025-07-14 RX ORDER — LEVOTHYROXINE SODIUM 125 MCG
125 TABLET ORAL DAILY
Qty: 100 TABLET | Refills: 1 | Status: SHIPPED | OUTPATIENT
Start: 2025-07-14

## 2025-07-14 RX ORDER — CHOLECALCIFEROL (VITAMIN D3) 1250 MCG
1 CAPSULE ORAL
Qty: 7 CAPSULE | Refills: 1 | Status: SHIPPED | OUTPATIENT
Start: 2025-07-14

## 2025-07-14 NOTE — PROGRESS NOTES
<1<1.0<1.0-<1.0-<1.0-<1.0-<1.0-<1.0-<1.0-<1.0  Lab duplicated antithyroglobulin antibody test, done different method, showed antithyroglobulin antibody 1.18, new finding, normal less than 0.91.  Previously negative by this method.  - Anti-Thyroglobulin Antibody; Future  - Thyroglobulin; Future    2. Postoperative hypothyroidism  Synthroid 0.125 mg 6 days a week, one and a half tablet one day a week  TSH target low normal range.  TSH 0.19-0.3-1.1-1.4-3.8-0.8-0.5-0.228-0.203-0.7-0.184-0.94-0.834-0.422-0.193-1.28-1.34-1.52-0.933  - T4, Free; Future  - TSH without Reflex; Future    3. Osteopenia, unspecified location  Calcium 9.9-9.8-10.0  DEXA 7/2021  Direct comparison is not possible because of the different devices  T-scores similar  2/22/2024  L-spine (L1-4):   BMD (g/cm2): 0.919  g/cm2 , T-score: -2.2 , Z-score: -0.9   Left hip (Neck):   BMD (g/cm2): 0.708  g/cm2 , T-score: -2.4 , Z-score: -1.1   Left hip (Total):   BMD (g/cm2): 0.726  g/cm2 , T-score: -2.2 , Z-score: -1.2   Right hip (Neck):   BMD (g/cm2): 0.713 ( g/cm2) , T-score: -2.3 , Z-score: -1.1   Right hip (Total):   BMD (g/cm2): 0.736 ( g/cm2) , T-score: -2.2 , Z-score: -1.2   FRAX      10 year probability of major osteoporotic fracture is estimated at 11.2 %.   10 year probability of hip fracture is estimated at 2.1 %.   IMPRESSION:IMPRESSION:   1. Low bone mass (osteopenia).   2.  No significant change since the previous study.     Rib fracture.  No smoking  No RA  No hip fracture in family  No long term steroid use  On alendronate before, had jaw pain and bone/joint pain. Took for 1 month.  Does exercise program, does not want treatment, understand risks    4. Vitamin D deficiency  Decrease vitamin D to every 14 days  Calcium 9.9-9.8-9.7-9.6-9.8-9.7-10.0  Vitamin D 50,000 IU every 14 days  Calcium 10.0-9.7  25 hydroxy vitamin D [id number]  - Vitamin D 25 Hydroxy; Future    5. Menopause  Follow. No